# Patient Record
Sex: FEMALE | Race: WHITE | NOT HISPANIC OR LATINO | Employment: FULL TIME | URBAN - METROPOLITAN AREA
[De-identification: names, ages, dates, MRNs, and addresses within clinical notes are randomized per-mention and may not be internally consistent; named-entity substitution may affect disease eponyms.]

---

## 2021-06-29 ENCOUNTER — OFFICE VISIT (OUTPATIENT)
Dept: ENDOCRINOLOGY | Facility: CLINIC | Age: 31
End: 2021-06-29
Payer: COMMERCIAL

## 2021-06-29 VITALS
HEIGHT: 61 IN | WEIGHT: 183 LBS | BODY MASS INDEX: 34.55 KG/M2 | HEART RATE: 74 BPM | SYSTOLIC BLOOD PRESSURE: 100 MMHG | DIASTOLIC BLOOD PRESSURE: 80 MMHG

## 2021-06-29 DIAGNOSIS — R53.83 FATIGUE, UNSPECIFIED TYPE: ICD-10-CM

## 2021-06-29 DIAGNOSIS — E66.09 CLASS 1 OBESITY DUE TO EXCESS CALORIES WITHOUT SERIOUS COMORBIDITY WITH BODY MASS INDEX (BMI) OF 34.0 TO 34.9 IN ADULT: ICD-10-CM

## 2021-06-29 DIAGNOSIS — E28.2 PCOS (POLYCYSTIC OVARIAN SYNDROME): Primary | ICD-10-CM

## 2021-06-29 DIAGNOSIS — E55.9 VITAMIN D DEFICIENCY: ICD-10-CM

## 2021-06-29 PROCEDURE — 99204 OFFICE O/P NEW MOD 45 MIN: CPT | Performed by: INTERNAL MEDICINE

## 2021-06-29 NOTE — PROGRESS NOTES
New Patient Note      CC: concern for PCOS    History of Present Illness:   31 yr female with suspected PCOS,  and pseudotumor cerebri(diagnosed based on papilledema) at [facility] in 2017  She is concerned about inability to loose weight and is concerned that it is related to PCOS  She reports fatigue, brain fog, restless legs and fractured sleep  Pt ht 5'1" Max weight 205lbs, current 180lbs  Family hx of obesity - mother 7'10" 36lbs, dad 10' 26+, brother 7'9" 300+    Diet: tried VLCD, low gluten and non daily food for last 2 months  Without change  Activity: hiking upto 12 miles twice weekly and generally more active  Tried weight training without much benefit  Menarche at age 14, were irregular throughout, she was placed on OCP at age 23  She first became pregnant in context of irregular use of OCP in 2015  She miscarried at 6-8 weeks  Second pregnancy was 2017 off OCP, miscarried  Then she tried to get pregnant but could not so she was seen by reproductive medicine  They suggested diagnosis of PCOS based on multiple ovarian cysts  She was started on inositol supplements and she conceived naturally with a successful pregnancy resulting in  2019  She cannot recall reported high testosterone levels but does report facial hair, upper lip requiring shaving since teenage times  Recently, she noticed that she has regular periods over last 2 months  LMP 21 lasted 5 days  Family Hx of thyroid: No    There is no problem list on file for this patient  No past medical history on file     Past Surgical History:   Procedure Laterality Date    WISDOM TOOTH EXTRACTION        Family History   Problem Relation Age of Onset    Hypertension Father     Heart attack Father     Stroke Father     Kidney cancer Maternal Grandmother     Stomach cancer Maternal Grandfather     Heart attack Maternal Grandfather     Lung cancer Paternal Grandmother     Polycystic ovary syndrome Cousin dad      Social History     Tobacco Use    Smoking status: Former Smoker     Quit date: 2021     Years since quittin 0    Smokeless tobacco: Former User   Substance Use Topics    Alcohol use: Yes     Comment: once a month (beer)     Allergies   Allergen Reactions    Strattera [Atomoxetine] Palpitations     Review of Systems   Constitutional: Positive for fatigue  HENT: Negative  Eyes: Negative  Respiratory: Negative  Cardiovascular: Negative  Gastrointestinal: Negative  Endocrine: Negative  Musculoskeletal: Negative  Skin: Negative  Hair loss, dry skin   Allergic/Immunologic: Negative  Neurological: Negative  RLS,    Hematological: Negative  Psychiatric/Behavioral: Negative  Current Outpatient Medications:     norgestrel-ethinyl estradiol (LO/OVRAL) 0 3 mg-30 mcg per tablet, Take 1 tablet by mouth, Disp: , Rfl:     Physical Exam:  Body mass index is 34 58 kg/m²  /80 (BP Location: Left arm, Patient Position: Sitting, Cuff Size: Large)   Pulse 74   Ht 5' 1" (1 549 m)   Wt 83 kg (183 lb)   BMI 34 58 kg/m²        Physical Exam  Constitutional:       Appearance: She is well-developed  HENT:      Head: Normocephalic  Eyes:      Pupils: Pupils are equal, round, and reactive to light  Neck:      Thyroid: No thyromegaly  Cardiovascular:      Rate and Rhythm: Normal rate  Heart sounds: Normal heart sounds  Pulmonary:      Effort: Pulmonary effort is normal       Breath sounds: Normal breath sounds  Abdominal:      General: Bowel sounds are normal       Palpations: Abdomen is soft  Musculoskeletal:         General: No deformity  Cervical back: Normal range of motion  Skin:     Capillary Refill: Capillary refill takes less than 2 seconds  Coloration: Skin is not pale  Findings: No rash  Neurological:      Mental Status: She is alert and oriented to person, place, and time           Labs:     No results found for: [id number], TSH, V3TACBA, B2SCXVI, THYROIDAB    Lab Results   Component Value Date    CREATININE 0 67 11/23/2015    BUN 16 11/23/2015     11/23/2015    K 3 9 11/23/2015     11/23/2015    CO2 27 2 11/23/2015     No results found for: EGFR    No results found for: ALT, AST, GGT, ALKPHOS, BILITOT    No results found for: CHOLESTEROL  No results found for: HDL  No results found for: TRIG  No results found for: NONHDLC      Impression:  1  PCOS (polycystic ovarian syndrome)    2  Vitamin D deficiency    3  Fatigue, unspecified type    4  Class 1 obesity due to excess calories without serious comorbidity with body mass index (BMI) of 34 0 to 34 9 in adult         Plan:    Diagnoses and all orders for this visit:    PCOS (polycystic ovarian syndrome)  She reports diagnosis in 2017 but does not recall testosterone levels  She has normal menstrual cycles and prior conception on inositol  She has never been on metformin  We will initiate work up with labs as listed below  She may have some insulin resistance but no acanthosis nigricans  Will also get labs from PCP done recent past     She seems to be controlled from diet and lifestyle  We will consider metformin vs other medications  Follow up in 6 months    -     Hemoglobin A1C; Future  -     17-Hydroxypregnenolone; Future  -     Testosterone, free, total; Future      Obesity  She reports difficulty losing weight with diet and lifestyle changes over 2 months  Other possibilities commonly contributing are untreated ALIDA, hypothyroidism, insulin resistance/PCOS, chronic inflammation, mental health illness etc   Suggested structured medical fitness program along with diet management  She might benefit from medications for weight loss  May consider a GLP1 agonist     Vitamin D deficiency  -     Vitamin D 25 hydroxy; Future    Fatigue, unspecified type  -     Iron Panel (Includes Ferritin, Iron Sat%, Iron, and TIBC);  Future  -     Comprehensive metabolic panel; Future  -     CBC and differential; Future        I have spent 50 minutes with patient today in which greater than 50% of this time was spent in counseling/coordination of care  All questioned fully answered  She will call me if any problems arise  Educated/ Counseled patient on diagnostic test results, prognosis, risk vs benefit of treatment options, importance of treatment compliance, healthy life and lifestyle choices        [address]

## 2021-06-30 ENCOUNTER — TELEPHONE (OUTPATIENT)
Dept: ENDOCRINOLOGY | Facility: CLINIC | Age: 31
End: 2021-06-30

## 2021-06-30 NOTE — TELEPHONE ENCOUNTER
----- Message from Carl Lenz MD sent at 6/30/2021 10:16 AM EDT -----  Please let her know that I reviewed some labs from May 18, 2021, were entered into our system  She has anemia which is low red blood cell counts  This alone can make her fatigued and she should speak to her PCP about this   Thank you

## 2022-01-03 PROBLEM — E28.2 PCOS (POLYCYSTIC OVARIAN SYNDROME): Status: ACTIVE | Noted: 2022-01-03

## 2022-07-07 ENCOUNTER — OFFICE VISIT (OUTPATIENT)
Dept: URGENT CARE | Facility: CLINIC | Age: 32
End: 2022-07-07
Payer: COMMERCIAL

## 2022-07-07 DIAGNOSIS — J02.9 ACUTE PHARYNGITIS, UNSPECIFIED ETIOLOGY: ICD-10-CM

## 2022-07-07 DIAGNOSIS — J02.9 SORE THROAT: ICD-10-CM

## 2022-07-07 DIAGNOSIS — J06.9 UPPER RESPIRATORY TRACT INFECTION, UNSPECIFIED TYPE: Primary | ICD-10-CM

## 2022-07-07 LAB — S PYO AG THROAT QL: NEGATIVE

## 2022-07-07 PROCEDURE — 87880 STREP A ASSAY W/OPTIC: CPT | Performed by: PHYSICIAN ASSISTANT

## 2022-07-07 PROCEDURE — G0382 LEV 3 HOSP TYPE B ED VISIT: HCPCS | Performed by: PHYSICIAN ASSISTANT

## 2022-07-07 RX ORDER — AZITHROMYCIN 250 MG/1
TABLET, FILM COATED ORAL
Qty: 6 TABLET | Refills: 0 | Status: SHIPPED | OUTPATIENT
Start: 2022-07-07 | End: 2022-07-11

## 2022-07-07 NOTE — PATIENT INSTRUCTIONS
Pharyngitis   AMBULATORY CARE:   Pharyngitis , or sore throat, is inflammation of the tissues and structures in your pharynx (throat)  Pharyngitis is most often caused by bacteria  It may also be caused by a cold or flu virus  Other causes include smoking, allergies, or acid reflux  Signs and symptoms that may occur with pharyngitis:   Sore throat or pain when you swallow    Fever, chills, and body aches    Hoarse or raspy voice    Cough, runny or stuffy nose, itchy or watery eyes    Headache    Upset stomach and loss of appetite    Mild neck stiffness    Swollen glands that feel like hard lumps when you touch your neck    White and yellow pus-filled blisters in the back of your throat    Call 911 for any of the following: You have trouble breathing or swallowing because your throat is swollen or sore  Seek care immediately if:   You are drooling because it hurts too much to swallow  Your fever is higher than 102? F (39?C) or lasts longer than 3 days  You are confused  You taste blood in your throat  Contact your healthcare provider if:   Your throat pain gets worse  You have a painful lump in your throat that does not go away after 5 days  Your symptoms do not improve after 5 days  You have questions or concerns about your condition or care  Treatment for pharyngitis:  Viral pharyngitis will go away on its own without treatment  Your sore throat should start to feel better in 3 to 5 days for both viral and bacterial infections  You may need any of the following:  Antibiotics  treat a bacterial infection  NSAIDs , such as ibuprofen, help decrease swelling, pain, and fever  NSAIDs can cause stomach bleeding or kidney problems in certain people  If you take blood thinner medicine, always ask your healthcare provider if NSAIDs are safe for you  Always read the medicine label and follow directions  Acetaminophen  decreases pain and fever  It is available without a doctor's order  Ask how much to take and how often to take it  Follow directions  Acetaminophen can cause liver damage if not taken correctly  Manage your symptoms:   Gargle salt water  Mix ¼ teaspoon salt in an 8 ounce glass of warm water and gargle  This may help decrease swelling in your throat  Drink liquids as directed  You may need to drink more liquids than usual  Liquids may help soothe your throat and prevent dehydration  Ask how much liquid to drink each day and which liquids are best for you  Use a cool-steam humidifier  to help moisten the air in your room and calm your cough  Soothe your throat  with cough drops, ice, soft foods, or popsicles  Prevent the spread of pharyngitis:  Cover your mouth and nose when you cough or sneeze  Do not share food or drinks  Wash your hands often  Use soap and water  If soap and water are unavailable, use an alcohol based hand   Follow up with your doctor as directed:  Write down your questions so you remember to ask them during your visits  © Copyright [employer] 2022 Information is for End User's use only and may not be sold, redistributed or otherwise used for commercial purposes  All illustrations and images included in CareNotes® are the copyrighted property of [employer]  or [facility] [employer]   The above information is an  only  It is not intended as medical advice for individual conditions or treatments  Talk to your doctor, nurse or pharmacist before following any medical regimen to see if it is safe and effective for you

## 2022-07-11 NOTE — PROGRESS NOTES
[facility]        NAME: Garfield Medina is a 34 y o  female  : 1990    MRN: [id number]  DATE: 2022  TIME: 6:52 AM    Assessment and Plan   Upper respiratory tract infection, unspecified type [J06 9]  1  Upper respiratory tract infection, unspecified type     2  Acute pharyngitis, unspecified etiology  azithromycin (ZITHROMAX) 250 mg tablet   3  Sore throat  POCT rapid strepA    CANCELED: Throat culture         Patient Instructions       Follow up with PCP in 3-5 days  Proceed to  ER if symptoms worsen  Chief Complaint     Chief Complaint   Patient presents with    Fever     Yesterday tmax 102  3  negative home test for COVID yesterday    Shortness of Breath    Sore Throat     Began tuesday         History of Present Illness       69-year-old female presents the clinic with sore throat, nasal congestion, rhinorrhea, shortness of breath, fevers that started yesterday  Patient notes T-max 102 3° at home  Patient is being seen with her son and mother who are also being seen for similar symptoms  Fever  Associated symptoms include congestion, coughing, a fever and a sore throat  Pertinent negatives include no abdominal pain, chills, headaches, myalgias, nausea or vomiting  Shortness of Breath  Associated symptoms include coughing, rhinorrhea and a sore throat  Pertinent negatives include no dizziness  Sore Throat   Associated symptoms include congestion, coughing and shortness of breath  Pertinent negatives include no abdominal pain, diarrhea, headaches or vomiting  Review of Systems   Review of Systems   Constitutional: Positive for fever  Negative for chills  HENT: Positive for congestion, rhinorrhea and sore throat  Respiratory: Positive for cough and shortness of breath  Gastrointestinal: Negative for abdominal pain, diarrhea, nausea and vomiting  Musculoskeletal: Negative for myalgias  Neurological: Negative for dizziness, light-headedness and headaches  Current Medications       Current Outpatient Medications:     azithromycin (ZITHROMAX) 250 mg tablet, Take 2 tablets today then 1 tablet daily x 4 days, Disp: 6 tablet, Rfl: 0    norgestrel-ethinyl estradiol (LO/OVRAL) 0 3 mg-30 mcg per tablet, Take 1 tablet by mouth, Disp: , Rfl:     Current Allergies     Allergies as of 07/07/2022 - Reviewed 07/07/2022   Allergen Reaction Noted    Strattera [atomoxetine] Palpitations 06/29/2021            The following portions of the patient's history were reviewed and updated as appropriate: allergies, current medications, past family history, past medical history, past social history, past surgical history and problem list      History reviewed  No pertinent past medical history  Past Surgical History:   Procedure Laterality Date    WISDOM TOOTH EXTRACTION         Family History   Problem Relation Age of Onset    Hypertension Father     Heart attack Father     Stroke Father     Kidney cancer Maternal Grandmother     Stomach cancer Maternal Grandfather     Heart attack Maternal Grandfather     Lung cancer Paternal Grandmother     Polycystic ovary syndrome Cousin         dad          Medications have been verified  Objective   There were no vitals taken for this visit  No LMP recorded  Physical Exam     Physical Exam  Vitals and nursing note reviewed  Constitutional:       General: She is not in acute distress  Appearance: She is well-developed  She is not diaphoretic  HENT:      Head: Normocephalic and atraumatic  Right Ear: Tympanic membrane normal       Left Ear: Tympanic membrane normal       Nose: Mucosal edema and rhinorrhea present  Mouth/Throat:      Pharynx: Uvula midline  Posterior oropharyngeal erythema (PND) present  Comments: Rapid strep negative  Cardiovascular:      Rate and Rhythm: Normal rate and regular rhythm     Pulmonary:      Effort: Pulmonary effort is normal       Breath sounds: Normal breath sounds  Musculoskeletal:         General: Normal range of motion  Skin:     General: Skin is warm and dry  Findings: No rash  Neurological:      Mental Status: She is alert and oriented to person, place, and time

## 2022-12-02 ENCOUNTER — OFFICE VISIT (OUTPATIENT)
Dept: URGENT CARE | Facility: CLINIC | Age: 32
End: 2022-12-02

## 2022-12-02 VITALS
OXYGEN SATURATION: 100 % | RESPIRATION RATE: 18 BRPM | TEMPERATURE: 98 F | SYSTOLIC BLOOD PRESSURE: 118 MMHG | DIASTOLIC BLOOD PRESSURE: 70 MMHG | HEART RATE: 88 BPM

## 2022-12-02 DIAGNOSIS — R05.1 ACUTE COUGH: ICD-10-CM

## 2022-12-02 DIAGNOSIS — H65.92 LEFT NON-SUPPURATIVE OTITIS MEDIA: Primary | ICD-10-CM

## 2022-12-02 RX ORDER — METHYLPREDNISOLONE 4 MG/1
TABLET ORAL
Qty: 21 TABLET | Refills: 0 | Status: SHIPPED | OUTPATIENT
Start: 2022-12-02

## 2022-12-02 RX ORDER — BUDESONIDE AND FORMOTEROL FUMARATE DIHYDRATE 160; 4.5 UG/1; UG/1
2 AEROSOL RESPIRATORY (INHALATION) 2 TIMES DAILY
COMMUNITY

## 2022-12-02 RX ORDER — AZITHROMYCIN 250 MG/1
TABLET, FILM COATED ORAL
Qty: 6 TABLET | Refills: 0 | Status: SHIPPED | OUTPATIENT
Start: 2022-12-02 | End: 2022-12-06

## 2022-12-02 RX ORDER — ALBUTEROL SULFATE 90 UG/1
2 AEROSOL, METERED RESPIRATORY (INHALATION) EVERY 6 HOURS PRN
COMMUNITY

## 2022-12-02 NOTE — PATIENT INSTRUCTIONS
Ear Infection   AMBULATORY CARE:   An ear infection  is also called otitis media  Blocked or swollen eustachian tubes can cause an infection  Eustachian tubes connect the middle ear to the back of the nose and throat  They drain fluid from the middle ear  You may have a buildup of fluid in your ear  Germs build up in the fluid and infection develops  Common signs and symptoms:   Ear pain    Fever or a headache    Trouble hearing    Ringing or buzzing in your ear    Plugged ear or an ear that feels full    Dizziness    Nausea or vomiting    Seek care immediately if:   You have clear fluid coming from your ear  You have a stiff neck, headache, and a fever  Call your doctor if:   You see blood or pus draining from your ear  Your ear pain gets worse or does not go away, even after treatment  The outside of your ear is red or swollen  You are vomiting or have diarrhea  You have questions or concerns about your condition or care  Medicines: You may  need any of the following:  Acetaminophen  decreases pain and fever  It is available without a doctor's order  Ask how much to take and how often to take it  Follow directions  Read the labels of all other medicines you are using to see if they also contain acetaminophen, or ask your doctor or pharmacist  Acetaminophen can cause liver damage if not taken correctly  Do not use more than 4 grams (4,000 milligrams) total of acetaminophen in one day  NSAIDs , such as ibuprofen, help decrease swelling, pain, and fever  This medicine is available with or without a doctor's order  NSAIDs can cause stomach bleeding or kidney problems in certain people  If you take blood thinner medicine, always ask your healthcare provider if NSAIDs are safe for you  Always read the medicine label and follow directions  Ear drops  may contain medicine to decrease pain and inflammation  Antibiotics  help treat a bacterial infection      Self-care:   Apply heat  on your ear for 15 to 20 minutes, 3 to 4 times a day or as directed  You can apply heat with an electric heating pad, hot water bottle, or warm compress  Always put a cloth between your skin and the heat pack to prevent burns  Heat helps decrease pain  Apply ice  on your ear for 15 to 20 minutes, 3 to 4 times a day for 2 days or as directed  Use an ice pack, or put crushed ice in a plastic bag  Cover it with a towel before you apply it to your ear  Ice decreases swelling and pain  Prevent an ear infection:   Wash your hands often  to help prevent the spread of germs  Ask everyone in your house to wash their hands with soap and water  Ask them to wash after they use the bathroom or change a diaper  Remind them to wash before they prepare or eat food  Stay away from people who are ill  Some germs spread easily and quickly through contact  Follow up with your doctor as directed:  Write down your questions so you remember to ask them during your visits  © Copyright [employer] 2022 Information is for End User's use only and may not be sold, redistributed or otherwise used for commercial purposes  All illustrations and images included in CareNotes® are the copyrighted property of [employer]  or [facility] [employer]   The above information is an  only  It is not intended as medical advice for individual conditions or treatments  Talk to your doctor, nurse or pharmacist before following any medical regimen to see if it is safe and effective for you

## 2023-01-02 NOTE — PROGRESS NOTES
[facility]        NAME: Ginny Kelley is a 34 y o  female  : 1990    MRN: [id number]  DATE: 2023  TIME: 6:40 AM    Assessment and Plan   Left non-suppurative otitis media [H65 92]  1  Left non-suppurative otitis media  azithromycin (ZITHROMAX) 250 mg tablet      2  Acute cough  methylPREDNISolone 4 MG tablet therapy pack            Patient Instructions       Follow up with PCP in 3-5 days  Proceed to  ER if symptoms worsen  Chief Complaint     Chief Complaint   Patient presents with   • Cough     (Pt had 3-yr-old son diagnosed with the flu) on  She began to have symptoms  Cough, sore throat, productive cough and fever  History of Present Illness       33 yo F presents with Cough, sore throat, productive cough and fever  Pt had 3-yr-old son diagnosed with the flu on  and on  She began to have symptoms  Review of Systems   Review of Systems   Constitutional: Positive for fever  Negative for chills  HENT: Positive for congestion and rhinorrhea  Respiratory: Positive for cough  Gastrointestinal: Negative for abdominal pain, diarrhea, nausea and vomiting  Musculoskeletal: Negative for myalgias  Neurological: Negative for dizziness, light-headedness and headaches           Current Medications       Current Outpatient Medications:   •  albuterol (PROVENTIL HFA,VENTOLIN HFA) 90 mcg/act inhaler, Inhale 2 puffs every 6 (six) hours as needed for wheezing, Disp: , Rfl:   •  budesonide-formoterol (SYMBICORT) 160-4 5 mcg/act inhaler, Inhale 2 puffs 2 (two) times a day Rinse mouth after use , Disp: , Rfl:   •  methylPREDNISolone 4 MG tablet therapy pack, Use as directed on package, Disp: 21 tablet, Rfl: 0  •  norgestrel-ethinyl estradiol (LO/OVRAL) 0 3 mg-30 mcg per tablet, Take 1 tablet by mouth, Disp: , Rfl:     Current Allergies     Allergies as of 2022 - Reviewed 2022   Allergen Reaction Noted   • Strattera [atomoxetine] Palpitations 06/29/2021            The following portions of the patient's history were reviewed and updated as appropriate: allergies, current medications, past family history, past medical history, past social history, past surgical history and problem list      History reviewed  No pertinent past medical history  Past Surgical History:   Procedure Laterality Date   • WISDOM TOOTH EXTRACTION         Family History   Problem Relation Age of Onset   • Hypertension Father    • Heart attack Father    • Stroke Father    • Kidney cancer Maternal Grandmother    • Stomach cancer Maternal Grandfather    • Heart attack Maternal Grandfather    • Lung cancer Paternal Grandmother    • Polycystic ovary syndrome Cousin         dad          Medications have been verified  Objective   /70   Pulse 88   Temp 98 °F (36 7 °C)   Resp 18   SpO2 100%   No LMP recorded  Physical Exam     Physical Exam  Vitals and nursing note reviewed  Constitutional:       General: She is not in acute distress  Appearance: She is well-developed  She is not diaphoretic  HENT:      Head: Normocephalic and atraumatic  Right Ear: Tympanic membrane normal       Left Ear: Tympanic membrane is erythematous  Nose: Mucosal edema and rhinorrhea present  Mouth/Throat:      Pharynx: Uvula midline  Posterior oropharyngeal erythema (PND) present  Cardiovascular:      Rate and Rhythm: Normal rate and regular rhythm  Pulmonary:      Effort: Pulmonary effort is normal       Breath sounds: Normal breath sounds  Musculoskeletal:         General: Normal range of motion  Skin:     General: Skin is warm and dry  Findings: No rash  Neurological:      Mental Status: She is alert and oriented to person, place, and time

## 2023-08-05 ENCOUNTER — OFFICE VISIT (OUTPATIENT)
Dept: URGENT CARE | Facility: CLINIC | Age: 33
End: 2023-08-05
Payer: COMMERCIAL

## 2023-08-05 VITALS
OXYGEN SATURATION: 99 % | HEART RATE: 75 BPM | TEMPERATURE: 98.5 F | RESPIRATION RATE: 18 BRPM | DIASTOLIC BLOOD PRESSURE: 78 MMHG | SYSTOLIC BLOOD PRESSURE: 112 MMHG

## 2023-08-05 DIAGNOSIS — J02.9 ACUTE PHARYNGITIS, UNSPECIFIED ETIOLOGY: Primary | ICD-10-CM

## 2023-08-05 PROCEDURE — G0382 LEV 3 HOSP TYPE B ED VISIT: HCPCS

## 2023-08-05 RX ORDER — DROSPIRENONE 4 MG/1
TABLET, FILM COATED ORAL
COMMUNITY
Start: 2023-07-25

## 2023-08-05 NOTE — PROGRESS NOTES
[facility] Now        NAME: Khadra Rodriguez is a 28 y.o. female  : 1990    MRN: [id number]  DATE: 2023  TIME: 6:31 PM    Assessment and Plan   Acute pharyngitis, unspecified etiology [J02.9]  1. Acute pharyngitis, unspecified etiology          - Declined rapid strep at this time    Patient Instructions       Follow up with PCP in 3-5 days. Proceed to  ER if symptoms worsen. Chief Complaint     Chief Complaint   Patient presents with   • Sore Throat     Pt reports sore throat x2 hours. History of Present Illness       27 y/o F presents for sore throat x 2 hrs. Son is ill, without sore throat. No OTC meds. No other symptoms. Review of Systems   Review of Systems   Constitutional: Negative for chills and fever. HENT: Positive for sore throat. Negative for congestion, ear pain and rhinorrhea. Respiratory: Negative for cough.           Current Medications       Current Outpatient Medications:   •  albuterol (PROVENTIL HFA,VENTOLIN HFA) 90 mcg/act inhaler, Inhale 2 puffs every 6 (six) hours as needed for wheezing, Disp: , Rfl:   •  budesonide-formoterol (SYMBICORT) 160-4.5 mcg/act inhaler, Inhale 2 puffs 2 (two) times a day Rinse mouth after use., Disp: , Rfl:   •  Slynd 4 MG TABS, , Disp: , Rfl:   •  methylPREDNISolone 4 MG tablet therapy pack, Use as directed on package (Patient not taking: Reported on 2023), Disp: 21 tablet, Rfl: 0  •  norgestrel-ethinyl estradiol (LO/OVRAL) 0.3 mg-30 mcg per tablet, Take 1 tablet by mouth (Patient not taking: Reported on 2023), Disp: , Rfl:     Current Allergies     Allergies as of 2023 - Reviewed 2022   Allergen Reaction Noted   • Strattera [atomoxetine] Palpitations 2021            The following portions of the patient's history were reviewed and updated as appropriate: allergies, current medications, past family history, past medical history, past social history, past surgical history and problem list.     No past medical history on file. Past Surgical History:   Procedure Laterality Date   • WISDOM TOOTH EXTRACTION         Family History   Problem Relation Age of Onset   • Hypertension Father    • Heart attack Father    • Stroke Father    • Kidney cancer Maternal Grandmother    • Stomach cancer Maternal Grandfather    • Heart attack Maternal Grandfather    • Lung cancer Paternal Grandmother    • Polycystic ovary syndrome Cousin         dad          Medications have been verified. Objective   /78   Pulse 75   Temp 98.5 °F (36.9 °C) (Temporal)   Resp 18   LMP 07/22/2023 (Exact Date) Comment: Pt reports LMP was normal.  SpO2 99%   Patient's last menstrual period was 07/22/2023 (exact date). Physical Exam     Physical Exam  Vitals and nursing note reviewed. Constitutional:       General: She is not in acute distress. Appearance: She is not toxic-appearing. HENT:      Head: Normocephalic and atraumatic. Right Ear: Tympanic membrane and ear canal normal.      Left Ear: Tympanic membrane and ear canal normal.      Nose: No congestion or rhinorrhea. Mouth/Throat:      Mouth: Mucous membranes are moist.      Pharynx: No pharyngeal swelling, oropharyngeal exudate or posterior oropharyngeal erythema. Tonsils: No tonsillar exudate. Lymphadenopathy:      Cervical: No cervical adenopathy. Neurological:      Mental Status: She is alert.    Psychiatric:         Mood and Affect: Mood normal.         Behavior: Behavior normal.

## 2023-11-14 ENCOUNTER — OFFICE VISIT (OUTPATIENT)
Dept: URGENT CARE | Facility: CLINIC | Age: 33
End: 2023-11-14
Payer: COMMERCIAL

## 2023-11-14 VITALS
WEIGHT: 190 LBS | OXYGEN SATURATION: 99 % | HEART RATE: 83 BPM | SYSTOLIC BLOOD PRESSURE: 120 MMHG | DIASTOLIC BLOOD PRESSURE: 80 MMHG | HEIGHT: 61 IN | BODY MASS INDEX: 35.87 KG/M2 | TEMPERATURE: 99.6 F | RESPIRATION RATE: 16 BRPM

## 2023-11-14 DIAGNOSIS — J02.9 PHARYNGITIS, UNSPECIFIED ETIOLOGY: ICD-10-CM

## 2023-11-14 DIAGNOSIS — Z20.822 ENCOUNTER FOR LABORATORY TESTING FOR COVID-19 VIRUS: ICD-10-CM

## 2023-11-14 DIAGNOSIS — J02.9 SORE THROAT: Primary | ICD-10-CM

## 2023-11-14 LAB
S PYO AG THROAT QL: NEGATIVE
SARS-COV-2 AG UPPER RESP QL IA: NEGATIVE
VALID CONTROL: NORMAL

## 2023-11-14 PROCEDURE — 99213 OFFICE O/P EST LOW 20 MIN: CPT | Performed by: PHYSICIAN ASSISTANT

## 2023-11-14 PROCEDURE — 87880 STREP A ASSAY W/OPTIC: CPT | Performed by: PHYSICIAN ASSISTANT

## 2023-11-14 PROCEDURE — 87811 SARS-COV-2 COVID19 W/OPTIC: CPT | Performed by: PHYSICIAN ASSISTANT

## 2023-11-14 RX ORDER — AMOXICILLIN 500 MG/1
500 CAPSULE ORAL EVERY 8 HOURS SCHEDULED
Qty: 30 CAPSULE | Refills: 0 | Status: SHIPPED | OUTPATIENT
Start: 2023-11-14 | End: 2023-11-24

## 2023-11-14 NOTE — PROGRESS NOTES
[facility]        NAME: Yaneli Cornelius is a 28 y.o. female  : 1990    MRN: [id number]  DATE: 2023  TIME: 8:51 AM    /80 (BP Location: Left arm, Patient Position: Sitting)   Pulse 83   Temp 99.6 °F (37.6 °C)   Resp 16   Ht 5' 1" (1.549 m)   Wt 86.2 kg (190 lb)   SpO2 99%   BMI 35.90 kg/m²     Assessment and Plan   Pharyngitis, unspecified etiology [J02.9]  1. Pharyngitis, unspecified etiology  POCT rapid strepA    Poct Covid 19 Rapid Antigen Test    amoxicillin (AMOXIL) 500 mg capsule      2. Encounter for laboratory testing for COVID-19 virus              Patient Instructions       Follow up with PCP in 3-5 days. Proceed to  ER if symptoms worsen. Chief Complaint     Chief Complaint   Patient presents with    Sore Throat     Pt reports sore throat that has been ongoing for one week intermittently. States progression of symptoms this morning. C/o cold like symptoms for two weeks. Positive Step exposure in household two weeks ago. History of Present Illness       Pt with 1 week of sore throat getting worse this morning,+ strep in family         Review of Systems   Review of Systems   Constitutional: Negative. HENT:  Positive for congestion and sore throat. Eyes: Negative. Respiratory:  Positive for cough. Cardiovascular: Negative. Gastrointestinal: Negative. Endocrine: Negative. Genitourinary: Negative. Musculoskeletal: Negative. Skin: Negative. Allergic/Immunologic: Negative. Neurological: Negative. Hematological: Negative. Psychiatric/Behavioral: Negative. All other systems reviewed and are negative.         Current Medications       Current Outpatient Medications:     amoxicillin (AMOXIL) 500 mg capsule, Take 1 capsule (500 mg total) by mouth every 8 (eight) hours for 10 days, Disp: 30 capsule, Rfl: 0    albuterol (PROVENTIL HFA,VENTOLIN HFA) 90 mcg/act inhaler, Inhale 2 puffs every 6 (six) hours as needed for wheezing (Patient not taking: Reported on 11/14/2023), Disp: , Rfl:     budesonide-formoterol (SYMBICORT) 160-4.5 mcg/act inhaler, Inhale 2 puffs 2 (two) times a day Rinse mouth after use. (Patient not taking: Reported on 11/14/2023), Disp: , Rfl:     methylPREDNISolone 4 MG tablet therapy pack, Use as directed on package (Patient not taking: Reported on 8/5/2023), Disp: 21 tablet, Rfl: 0    norgestrel-ethinyl estradiol (LO/OVRAL) 0.3 mg-30 mcg per tablet, Take 1 tablet by mouth (Patient not taking: Reported on 8/5/2023), Disp: , Rfl:     Slynd 4 MG TABS, , Disp: , Rfl:     Current Allergies     Allergies as of 11/14/2023 - Reviewed 11/14/2023   Allergen Reaction Noted    Strattera [atomoxetine] Palpitations 06/29/2021            The following portions of the patient's history were reviewed and updated as appropriate: allergies, current medications, past family history, past medical history, past social history, past surgical history and problem list.     History reviewed. No pertinent past medical history. Past Surgical History:   Procedure Laterality Date    WISDOM TOOTH EXTRACTION         Family History   Problem Relation Age of Onset    Hypertension Father     Heart attack Father     Stroke Father     Kidney cancer Maternal Grandmother     Stomach cancer Maternal Grandfather     Heart attack Maternal Grandfather     Lung cancer Paternal Grandmother     Polycystic ovary syndrome Cousin         dad          Medications have been verified. Objective   /80 (BP Location: Left arm, Patient Position: Sitting)   Pulse 83   Temp 99.6 °F (37.6 °C)   Resp 16   Ht 5' 1" (1.549 m)   Wt 86.2 kg (190 lb)   SpO2 99%   BMI 35.90 kg/m²        Physical Exam     Physical Exam  Vitals and nursing note reviewed. Constitutional:       Appearance: She is well-developed and normal weight. HENT:      Head: Normocephalic and atraumatic.       Right Ear: Tympanic membrane and ear canal normal.      Left Ear: Tympanic membrane and ear canal normal.      Nose: Rhinorrhea present. Mouth/Throat:      Mouth: Mucous membranes are moist.      Pharynx: Posterior oropharyngeal erythema present. Tonsils: No tonsillar exudate or tonsillar abscesses. Eyes:      Conjunctiva/sclera: Conjunctivae normal.      Pupils: Pupils are equal, round, and reactive to light. Cardiovascular:      Rate and Rhythm: Normal rate and regular rhythm. Heart sounds: Normal heart sounds. Pulmonary:      Effort: Pulmonary effort is normal.      Breath sounds: Normal breath sounds. Abdominal:      General: Bowel sounds are normal.      Palpations: Abdomen is soft. Musculoskeletal:      Cervical back: Normal range of motion and neck supple. Lymphadenopathy:      Cervical: Cervical adenopathy present. Skin:     General: Skin is warm. Capillary Refill: Capillary refill takes less than 2 seconds. Neurological:      General: No focal deficit present. Mental Status: She is alert.

## 2023-11-16 LAB — B-HEM STREP SPEC QL CULT: POSITIVE

## 2023-12-14 ENCOUNTER — OFFICE VISIT (OUTPATIENT)
Dept: URGENT CARE | Facility: CLINIC | Age: 33
End: 2023-12-14
Payer: COMMERCIAL

## 2023-12-14 VITALS
RESPIRATION RATE: 18 BRPM | DIASTOLIC BLOOD PRESSURE: 84 MMHG | SYSTOLIC BLOOD PRESSURE: 114 MMHG | TEMPERATURE: 97.6 F | OXYGEN SATURATION: 100 % | HEART RATE: 89 BPM

## 2023-12-14 DIAGNOSIS — N39.0 URINARY TRACT INFECTION WITH HEMATURIA, SITE UNSPECIFIED: Primary | ICD-10-CM

## 2023-12-14 DIAGNOSIS — R31.9 URINARY TRACT INFECTION WITH HEMATURIA, SITE UNSPECIFIED: Primary | ICD-10-CM

## 2023-12-14 LAB
SL AMB  POCT GLUCOSE, UA: ABNORMAL
SL AMB LEUKOCYTE ESTERASE,UA: ABNORMAL
SL AMB POCT BILIRUBIN,UA: ABNORMAL
SL AMB POCT BLOOD,UA: ABNORMAL
SL AMB POCT CLARITY,UA: CLEAR
SL AMB POCT COLOR,UA: ABNORMAL
SL AMB POCT KETONES,UA: ABNORMAL
SL AMB POCT NITRITE,UA: ABNORMAL
SL AMB POCT PH,UA: 6.5
SL AMB POCT SPECIFIC GRAVITY,UA: 1.01
SL AMB POCT URINE PROTEIN: ABNORMAL
SL AMB POCT UROBILINOGEN: 0.2

## 2023-12-14 PROCEDURE — 99213 OFFICE O/P EST LOW 20 MIN: CPT | Performed by: PHYSICIAN ASSISTANT

## 2023-12-14 PROCEDURE — 81002 URINALYSIS NONAUTO W/O SCOPE: CPT | Performed by: PHYSICIAN ASSISTANT

## 2023-12-14 RX ORDER — CEPHALEXIN 500 MG/1
500 CAPSULE ORAL EVERY 8 HOURS SCHEDULED
Qty: 30 CAPSULE | Refills: 0 | Status: SHIPPED | OUTPATIENT
Start: 2023-12-14 | End: 2023-12-24

## 2023-12-14 NOTE — PROGRESS NOTES
[facility]        NAME: Joana Osorio is a 28 y.o. female  : 1990    MRN: [id number]  DATE: 2023  TIME: 10:59 AM    /84   Pulse 89   Temp 97.6 °F (36.4 °C)   Resp 18   SpO2 100%     Assessment and Plan   Dysuria [R30.0]  1. Dysuria  POCT urine dip    Urine culture    Urine culture            Patient Instructions       Follow up with PCP in 3-5 days. Proceed to  ER if symptoms worsen. Chief Complaint     Chief Complaint   Patient presents with    Possible UTI     Patient c/o dysuria with fever and chills. History of Present Illness       Pt with urine discomfort for several days         Review of Systems   Review of Systems   Constitutional: Negative. HENT: Negative. Eyes: Negative. Respiratory: Negative. Cardiovascular: Negative. Gastrointestinal: Negative. Endocrine: Negative. Genitourinary:  Positive for dysuria. Musculoskeletal: Negative. Skin: Negative. Allergic/Immunologic: Negative. Neurological: Negative. Hematological: Negative. Psychiatric/Behavioral: Negative. All other systems reviewed and are negative. Current Medications       Current Outpatient Medications:     albuterol (PROVENTIL HFA,VENTOLIN HFA) 90 mcg/act inhaler, Inhale 2 puffs every 6 (six) hours as needed for wheezing (Patient not taking: Reported on 2023), Disp: , Rfl:     budesonide-formoterol (SYMBICORT) 160-4.5 mcg/act inhaler, Inhale 2 puffs 2 (two) times a day Rinse mouth after use.  (Patient not taking: Reported on 2023), Disp: , Rfl:     methylPREDNISolone 4 MG tablet therapy pack, Use as directed on package (Patient not taking: Reported on 2023), Disp: 21 tablet, Rfl: 0    norgestrel-ethinyl estradiol (LO/OVRAL) 0.3 mg-30 mcg per tablet, Take 1 tablet by mouth (Patient not taking: Reported on 2023), Disp: , Rfl:     Slynd 4 MG TABS, , Disp: , Rfl:     Current Allergies     Allergies as of 2023 - Reviewed 12/14/2023   Allergen Reaction Noted    Strattera [atomoxetine] Palpitations 06/29/2021            The following portions of the patient's history were reviewed and updated as appropriate: allergies, current medications, past family history, past medical history, past social history, past surgical history and problem list.     No past medical history on file. Past Surgical History:   Procedure Laterality Date    WISDOM TOOTH EXTRACTION         Family History   Problem Relation Age of Onset    Hypertension Father     Heart attack Father     Stroke Father     Kidney cancer Maternal Grandmother     Stomach cancer Maternal Grandfather     Heart attack Maternal Grandfather     Lung cancer Paternal Grandmother     Polycystic ovary syndrome Cousin         dad          Medications have been verified. Objective   /84   Pulse 89   Temp 97.6 °F (36.4 °C)   Resp 18   SpO2 100%        Physical Exam     Physical Exam  Vitals and nursing note reviewed. Constitutional:       Appearance: Normal appearance. She is normal weight. HENT:      Head: Normocephalic and atraumatic. Cardiovascular:      Rate and Rhythm: Normal rate and regular rhythm. Pulses: Normal pulses. Heart sounds: Normal heart sounds. Pulmonary:      Effort: Pulmonary effort is normal.      Breath sounds: Normal breath sounds. Abdominal:      General: Abdomen is flat. Bowel sounds are normal.      Palpations: Abdomen is soft. Musculoskeletal:         General: Normal range of motion. Cervical back: Normal range of motion and neck supple. Skin:     General: Skin is warm. Neurological:      Mental Status: She is alert.

## 2023-12-16 LAB
BACTERIA UR CULT: NORMAL
Lab: NORMAL

## 2024-01-02 ENCOUNTER — OFFICE VISIT (OUTPATIENT)
Dept: URGENT CARE | Facility: CLINIC | Age: 34
End: 2024-01-02
Payer: COMMERCIAL

## 2024-01-02 VITALS
TEMPERATURE: 99.4 F | OXYGEN SATURATION: 100 % | RESPIRATION RATE: 16 BRPM | HEIGHT: 61 IN | BODY MASS INDEX: 35.9 KG/M2 | HEART RATE: 114 BPM

## 2024-01-02 DIAGNOSIS — R30.0 DYSURIA: Primary | ICD-10-CM

## 2024-01-02 LAB
SL AMB  POCT GLUCOSE, UA: NEGATIVE
SL AMB LEUKOCYTE ESTERASE,UA: NEGATIVE
SL AMB POCT BILIRUBIN,UA: NEGATIVE
SL AMB POCT BLOOD,UA: ABNORMAL
SL AMB POCT CLARITY,UA: ABNORMAL
SL AMB POCT COLOR,UA: YELLOW
SL AMB POCT KETONES,UA: NEGATIVE
SL AMB POCT NITRITE,UA: NEGATIVE
SL AMB POCT PH,UA: 6.5
SL AMB POCT SPECIFIC GRAVITY,UA: 1.01
SL AMB POCT URINE PROTEIN: ABNORMAL
SL AMB POCT UROBILINOGEN: 0.2

## 2024-01-02 PROCEDURE — 99213 OFFICE O/P EST LOW 20 MIN: CPT | Performed by: NURSE PRACTITIONER

## 2024-01-02 PROCEDURE — 81002 URINALYSIS NONAUTO W/O SCOPE: CPT | Performed by: NURSE PRACTITIONER

## 2024-01-02 NOTE — PROGRESS NOTES
[facility]        NAME: Karli Peck is a 33 y.o. female  : 1990    MRN: [id number]  DATE: 2024  TIME: 6:24 PM    Assessment and Plan   Dysuria [R30.0]  1. Dysuria  POCT urine dip    Urine culture        Acute symptomatic POCT urine dip showed positive for large blood.  Will send urine culture.  Educated patient since the last urine culture was negative for UTI would recommend not starting another antibiotic until results return for culture.  Will recommend follow-up with gynecology on Thursday.  Any worsening of symptoms should report to ED    Patient Instructions       Follow up with PCP in 3-5 days.  Proceed to  ER if symptoms worsen.    Chief Complaint     Chief Complaint   Patient presents with   • Possible UTI     Pt reports dysuria, frequency, and itching with onset of symptoms today.  was seen in  on  for uti symptoms and prescribed Cefalexin.  took morning and afternoon dose but, unable to take nighttime dose. Finished treatment over the weekend without resolution of symptoms.          History of Present Illness       Patient is a 33-year-old female arrives with complaints of dysuria and frequent urination.  She was seen here 2023 provided Keflex she did take twice daily x 7 days however she reports symptoms never went away.  At last office visit her POCT urine dip showed positive for leukocytes and large amounts of blood.  The urine culture returned negative had less than 10,000 of 1 bacteria.  At this office visit her POCT urine dip showed positive for large amounts of blood again will send urine culture.  Patient does have a gynecology appointment on Thursday of this week.        Review of Systems   Review of Systems   Constitutional:  Negative for activity change, appetite change, chills, fatigue and fever.   HENT:  Negative for congestion, postnasal drip, rhinorrhea, sneezing and sore throat.    Respiratory:  Negative for cough, chest tightness,  shortness of breath and wheezing.    Cardiovascular:  Negative for chest pain and palpitations.   Gastrointestinal:  Negative for abdominal pain, constipation, diarrhea, nausea and vomiting.   Genitourinary:  Positive for dysuria, frequency and hematuria. Negative for flank pain and urgency.   Musculoskeletal:  Negative for arthralgias and myalgias.   Skin:  Negative for color change, pallor and rash.   Neurological:  Negative for dizziness, weakness, light-headedness and headaches.   Hematological:  Negative for adenopathy.   Psychiatric/Behavioral:  Negative for agitation and confusion.          Current Medications       Current Outpatient Medications:   •  albuterol (PROVENTIL HFA,VENTOLIN HFA) 90 mcg/act inhaler, Inhale 2 puffs every 6 (six) hours as needed for wheezing (Patient not taking: Reported on 11/14/2023), Disp: , Rfl:   •  budesonide-formoterol (SYMBICORT) 160-4.5 mcg/act inhaler, Inhale 2 puffs 2 (two) times a day Rinse mouth after use. (Patient not taking: Reported on 11/14/2023), Disp: , Rfl:   •  methylPREDNISolone 4 MG tablet therapy pack, Use as directed on package (Patient not taking: Reported on 8/5/2023), Disp: 21 tablet, Rfl: 0  •  norgestrel-ethinyl estradiol (LO/OVRAL) 0.3 mg-30 mcg per tablet, Take 1 tablet by mouth (Patient not taking: Reported on 8/5/2023), Disp: , Rfl:   •  Slynd 4 MG TABS, , Disp: , Rfl:     Current Allergies     Allergies as of 01/02/2024 - Reviewed 01/02/2024   Allergen Reaction Noted   • Strattera [atomoxetine] Palpitations 06/29/2021            The following portions of the patient's history were reviewed and updated as appropriate: allergies, current medications, past family history, past medical history, past social history, past surgical history and problem list.     History reviewed. No pertinent past medical history.    Past Surgical History:   Procedure Laterality Date   • WISDOM TOOTH EXTRACTION         Family History   Problem Relation Age of Onset   •  "Hypertension Father    • Heart attack Father    • Stroke Father    • Kidney cancer Maternal Grandmother    • Stomach cancer Maternal Grandfather    • Heart attack Maternal Grandfather    • Lung cancer Paternal Grandmother    • Polycystic ovary syndrome Cousin         dad          Medications have been verified.        Objective   Pulse (!) 114   Temp 99.4 °F (37.4 °C)   Resp 16   Ht 5' 1\" (1.549 m)   SpO2 100%   BMI 35.90 kg/m²   No LMP recorded.       Physical Exam     Physical Exam  Vitals and nursing note reviewed.   Constitutional:       General: She is not in acute distress.     Appearance: Normal appearance. She is not ill-appearing or diaphoretic.   HENT:      Head: Normocephalic and atraumatic.      Nose: No congestion or rhinorrhea.   Eyes:      General: No scleral icterus.        Right eye: No discharge.         Left eye: No discharge.   Pulmonary:      Effort: Pulmonary effort is normal. No respiratory distress.   Abdominal:      Tenderness: There is no abdominal tenderness. There is no right CVA tenderness, left CVA tenderness or guarding.   Musculoskeletal:         General: Normal range of motion.      Cervical back: Normal range of motion.   Skin:     Coloration: Skin is not jaundiced or pale.      Findings: No bruising, erythema or rash.   Neurological:      General: No focal deficit present.      Mental Status: She is alert and oriented to person, place, and time.   Psychiatric:         Mood and Affect: Mood normal.         Behavior: Behavior normal.         Thought Content: Thought content normal.         Judgment: Judgment normal.                   "

## 2024-01-08 LAB
BACTERIA UR CULT: ABNORMAL
Lab: ABNORMAL
SL AMB ANTIMICROBIAL SUSCEPTIBILITY: ABNORMAL

## 2024-01-09 ENCOUNTER — TELEPHONE (OUTPATIENT)
Dept: URGENT CARE | Facility: CLINIC | Age: 34
End: 2024-01-09

## 2024-09-09 ENCOUNTER — TELEPHONE (OUTPATIENT)
Dept: BARIATRICS | Facility: CLINIC | Age: 34
End: 2024-09-09

## 2024-09-20 ENCOUNTER — APPOINTMENT (EMERGENCY)
Dept: CT IMAGING | Facility: HOSPITAL | Age: 34
End: 2024-09-20
Payer: COMMERCIAL

## 2024-09-20 ENCOUNTER — HOSPITAL ENCOUNTER (EMERGENCY)
Facility: HOSPITAL | Age: 34
Discharge: HOME | End: 2024-09-20
Attending: EMERGENCY MEDICINE
Payer: COMMERCIAL

## 2024-09-20 VITALS
RESPIRATION RATE: 18 BRPM | OXYGEN SATURATION: 98 % | TEMPERATURE: 98.6 F | DIASTOLIC BLOOD PRESSURE: 69 MMHG | SYSTOLIC BLOOD PRESSURE: 111 MMHG | HEART RATE: 87 BPM

## 2024-09-20 DIAGNOSIS — N39.0 URINARY TRACT INFECTION: Primary | ICD-10-CM

## 2024-09-20 LAB
ANION GAP SERPL CALCULATED.3IONS-SCNC: 7 MMOL/L (ref 4–13)
BACTERIA UR QL AUTO: ABNORMAL /HPF
BASOPHILS # BLD AUTO: 0.03 THOUSANDS/ΜL (ref 0–0.1)
BASOPHILS NFR BLD AUTO: 0 % (ref 0–1)
BILIRUB UR QL STRIP: ABNORMAL
BILIRUB UR QL STRIP: NEGATIVE
BUN SERPL-MCNC: 13 MG/DL (ref 5–25)
CALCIUM SERPL-MCNC: 9.1 MG/DL (ref 8.4–10.2)
CHLORIDE SERPL-SCNC: 104 MMOL/L (ref 96–108)
CLARITY UR: ABNORMAL
CLARITY UR: ABNORMAL
CO2 SERPL-SCNC: 26 MMOL/L (ref 21–32)
COLOR UR: ABNORMAL
COLOR UR: ABNORMAL
CREAT SERPL-MCNC: 0.87 MG/DL (ref 0.6–1.3)
EOSINOPHIL # BLD AUTO: 0.09 THOUSAND/ΜL (ref 0–0.61)
EOSINOPHIL NFR BLD AUTO: 1 % (ref 0–6)
ERYTHROCYTE [DISTWIDTH] IN BLOOD BY AUTOMATED COUNT: 13.7 % (ref 11.6–15.1)
EXT PREGNANCY TEST URINE: NEGATIVE
EXT. CONTROL: NORMAL
GFR SERPL CREATININE-BSD FRML MDRD: 86 ML/MIN/1.73SQ M
GLUCOSE SERPL-MCNC: 78 MG/DL (ref 65–140)
GLUCOSE UR STRIP-MCNC: ABNORMAL MG/DL
GLUCOSE UR STRIP-MCNC: NEGATIVE MG/DL
HCT VFR BLD AUTO: 42.4 % (ref 34.8–46.1)
HGB BLD-MCNC: 13.6 G/DL (ref 11.5–15.4)
HGB UR QL STRIP.AUTO: ABNORMAL
HGB UR QL STRIP.AUTO: ABNORMAL
IMM GRANULOCYTES # BLD AUTO: 0.06 THOUSAND/UL (ref 0–0.2)
IMM GRANULOCYTES NFR BLD AUTO: 0 % (ref 0–2)
KETONES UR STRIP-MCNC: ABNORMAL MG/DL
KETONES UR STRIP-MCNC: NEGATIVE MG/DL
LEUKOCYTE ESTERASE UR QL STRIP: ABNORMAL
LEUKOCYTE ESTERASE UR QL STRIP: ABNORMAL
LYMPHOCYTES # BLD AUTO: 2.45 THOUSANDS/ΜL (ref 0.6–4.47)
LYMPHOCYTES NFR BLD AUTO: 17 % (ref 14–44)
MCH RBC QN AUTO: 27.9 PG (ref 26.8–34.3)
MCHC RBC AUTO-ENTMCNC: 32.1 G/DL (ref 31.4–37.4)
MCV RBC AUTO: 87 FL (ref 82–98)
MONOCYTES # BLD AUTO: 0.84 THOUSAND/ΜL (ref 0.17–1.22)
MONOCYTES NFR BLD AUTO: 6 % (ref 4–12)
NEUTROPHILS # BLD AUTO: 10.96 THOUSANDS/ΜL (ref 1.85–7.62)
NEUTS SEG NFR BLD AUTO: 76 % (ref 43–75)
NITRITE UR QL STRIP: NEGATIVE
NITRITE UR QL STRIP: POSITIVE
NON-SQ EPI CELLS URNS QL MICRO: ABNORMAL /HPF
NRBC BLD AUTO-RTO: 0 /100 WBCS
PH UR STRIP.AUTO: 6.5 [PH]
PH UR STRIP.AUTO: 8.5 [PH] (ref 4.5–8)
PLATELET # BLD AUTO: 354 THOUSANDS/UL (ref 149–390)
PMV BLD AUTO: 8.8 FL (ref 8.9–12.7)
POTASSIUM SERPL-SCNC: 4.2 MMOL/L (ref 3.5–5.3)
PROT UR STRIP-MCNC: >=300 MG/DL
PROT UR STRIP-MCNC: ABNORMAL MG/DL
RBC # BLD AUTO: 4.87 MILLION/UL (ref 3.81–5.12)
RBC #/AREA URNS AUTO: ABNORMAL /HPF
SODIUM SERPL-SCNC: 137 MMOL/L (ref 135–147)
SP GR UR STRIP.AUTO: 1.01 (ref 1–1.03)
SP GR UR STRIP.AUTO: >=1.03 (ref 1–1.03)
UROBILINOGEN UR QL STRIP.AUTO: >=8 E.U./DL
UROBILINOGEN UR STRIP-ACNC: <2 MG/DL
WBC # BLD AUTO: 14.43 THOUSAND/UL (ref 4.31–10.16)
WBC #/AREA URNS AUTO: ABNORMAL /HPF

## 2024-09-20 PROCEDURE — 81025 URINE PREGNANCY TEST: CPT | Performed by: PHYSICIAN ASSISTANT

## 2024-09-20 PROCEDURE — 36415 COLL VENOUS BLD VENIPUNCTURE: CPT | Performed by: PHYSICIAN ASSISTANT

## 2024-09-20 PROCEDURE — 81001 URINALYSIS AUTO W/SCOPE: CPT | Performed by: PHYSICIAN ASSISTANT

## 2024-09-20 PROCEDURE — 80048 BASIC METABOLIC PNL TOTAL CA: CPT | Performed by: PHYSICIAN ASSISTANT

## 2024-09-20 PROCEDURE — 99284 EMERGENCY DEPT VISIT MOD MDM: CPT

## 2024-09-20 PROCEDURE — 99284 EMERGENCY DEPT VISIT MOD MDM: CPT | Performed by: PHYSICIAN ASSISTANT

## 2024-09-20 PROCEDURE — 85025 COMPLETE CBC W/AUTO DIFF WBC: CPT | Performed by: PHYSICIAN ASSISTANT

## 2024-09-20 PROCEDURE — 96365 THER/PROPH/DIAG IV INF INIT: CPT

## 2024-09-20 PROCEDURE — 96361 HYDRATE IV INFUSION ADD-ON: CPT

## 2024-09-20 PROCEDURE — 87086 URINE CULTURE/COLONY COUNT: CPT | Performed by: PHYSICIAN ASSISTANT

## 2024-09-20 PROCEDURE — 74176 CT ABD & PELVIS W/O CONTRAST: CPT

## 2024-09-20 PROCEDURE — 96375 TX/PRO/DX INJ NEW DRUG ADDON: CPT

## 2024-09-20 RX ORDER — ONDANSETRON 2 MG/ML
4 INJECTION INTRAMUSCULAR; INTRAVENOUS ONCE
Status: COMPLETED | OUTPATIENT
Start: 2024-09-20 | End: 2024-09-20

## 2024-09-20 RX ORDER — NAPROXEN 500 MG/1
500 TABLET ORAL 2 TIMES DAILY PRN
Qty: 20 TABLET | Refills: 0 | Status: SHIPPED | OUTPATIENT
Start: 2024-09-20

## 2024-09-20 RX ORDER — KETOROLAC TROMETHAMINE 30 MG/ML
15 INJECTION, SOLUTION INTRAMUSCULAR; INTRAVENOUS ONCE
Status: COMPLETED | OUTPATIENT
Start: 2024-09-20 | End: 2024-09-20

## 2024-09-20 RX ORDER — CEFTRIAXONE 1 G/50ML
1000 INJECTION, SOLUTION INTRAVENOUS ONCE
Status: COMPLETED | OUTPATIENT
Start: 2024-09-20 | End: 2024-09-20

## 2024-09-20 RX ORDER — CEPHALEXIN 500 MG/1
500 CAPSULE ORAL EVERY 6 HOURS SCHEDULED
Qty: 28 CAPSULE | Refills: 0 | Status: SHIPPED | OUTPATIENT
Start: 2024-09-20 | End: 2024-09-27

## 2024-09-20 RX ADMIN — CEFTRIAXONE 1000 MG: 1 INJECTION, SOLUTION INTRAVENOUS at 13:28

## 2024-09-20 RX ADMIN — SODIUM CHLORIDE 1000 ML: 0.9 INJECTION, SOLUTION INTRAVENOUS at 11:20

## 2024-09-20 RX ADMIN — KETOROLAC TROMETHAMINE 15 MG: 30 INJECTION, SOLUTION INTRAMUSCULAR; INTRAVENOUS at 11:21

## 2024-09-20 RX ADMIN — ONDANSETRON 4 MG: 2 INJECTION INTRAMUSCULAR; INTRAVENOUS at 11:21

## 2024-09-20 NOTE — ED PROVIDER NOTES
"1. Urinary tract infection      ED Disposition       ED Disposition   Discharge    Condition   Stable    Date/Time   Fri Sep 20, 2024  2:08 PM    Comment   Karli Peck discharge to home/self care.                   Assessment & Plan       Medical Decision Making  Flank/lower back pain as well as lower abd pain in setting of gross hematuria, frequency, dysuria. Chills, no fevers noted. Lab workup reveals leukocytosis. Urine testing with +leukocytes, hematuria c/w UTI. CT abd/pelvis w/o evidence of obstructing stone, mild perivesicular stranding noted. Suspect UTI, possibly ascending infection given back pain/leukocytosis. Given ceftriaxone x1, plan to d/c on keflex pending urine culture results. Return to ED indications reviewed.     Amount and/or Complexity of Data Reviewed  Labs: ordered.  Radiology: ordered.    Risk  Prescription drug management.                     Medications   ketorolac (TORADOL) injection 15 mg (15 mg Intravenous Given 9/20/24 1121)   ondansetron (ZOFRAN) injection 4 mg (4 mg Intravenous Given 9/20/24 1121)   sodium chloride 0.9 % bolus 1,000 mL (0 mL Intravenous Stopped 9/20/24 1220)   cefTRIAXone (ROCEPHIN) IVPB (premix in dextrose) 1,000 mg 50 mL (0 mg Intravenous Stopped 9/20/24 1358)       History of Present Illness     Chief Complaint   Patient presents with    Flank Pain     Pt states \"I think I have a kidney stone. I have left back pain.\" Reports blood and clots with urine. Denies fevers.        Karli is a 35 yo F presenting with gross hematuria as well as lower back/flank and lower abdominal discomfort which began over the past 1-2 days. The symptoms began in lower back/flank but have since spread to lower abdomen primarily. Also cites urinary frequency/dysuria. Chills, no documented fever. Does report history of previous UTI's.       History provided by:  Patient   used: No        Review of Systems   Constitutional:  Negative for chills and fever.   HENT:  " Negative for congestion, rhinorrhea and sore throat.    Eyes:  Negative for pain and visual disturbance.   Respiratory:  Negative for cough, shortness of breath and wheezing.    Cardiovascular:  Negative for chest pain and palpitations.   Gastrointestinal:  Negative for abdominal pain, nausea and vomiting.   Genitourinary:  Positive for dysuria, flank pain, frequency and hematuria. Negative for urgency, vaginal bleeding and vaginal discharge.   Musculoskeletal:  Positive for back pain. Negative for neck pain and neck stiffness.   Skin:  Negative for rash and wound.   Neurological:  Negative for dizziness, weakness, light-headedness and numbness.           Objective     ED Triage Vitals [09/20/24 1037]   Temperature Pulse Blood Pressure Respirations SpO2 Patient Position - Orthostatic VS   98.6 °F (37 °C) 95 121/90 20 99 % Sitting      Temp Source Heart Rate Source BP Location FiO2 (%) Pain Score    Temporal Dorsalis pedis Right arm -- 10 - Worst Possible Pain        Physical Exam  Constitutional:       General: She is not in acute distress.     Appearance: She is well-developed. She is not diaphoretic.   HENT:      Head: Normocephalic and atraumatic.      Right Ear: External ear normal.      Left Ear: External ear normal.   Eyes:      Extraocular Movements:      Right eye: Normal extraocular motion.      Left eye: Normal extraocular motion.      Conjunctiva/sclera: Conjunctivae normal.      Pupils: Pupils are equal, round, and reactive to light.   Cardiovascular:      Rate and Rhythm: Normal rate and regular rhythm.   Pulmonary:      Effort: Pulmonary effort is normal. No accessory muscle usage or respiratory distress.      Breath sounds: No wheezing or rales.   Abdominal:      General: Abdomen is flat. There is no distension.      Tenderness: There is abdominal tenderness. There is no right CVA tenderness, left CVA tenderness or guarding.      Comments: Suprapubic TTP. Mild TTP to L lower back inferior to CVA  although not directly over CVA. No abdominal rigidity/rebound/guarding.    Musculoskeletal:      Cervical back: Normal range of motion. No rigidity.   Skin:     General: Skin is warm and dry.      Capillary Refill: Capillary refill takes less than 2 seconds.      Findings: No erythema or rash.   Neurological:      Mental Status: She is alert and oriented to person, place, and time.      Motor: No abnormal muscle tone.      Coordination: Coordination normal.   Psychiatric:         Behavior: Behavior normal.         Thought Content: Thought content normal.         Judgment: Judgment normal.         Labs Reviewed   CBC AND DIFFERENTIAL - Abnormal       Result Value    WBC 14.43 (*)     RBC 4.87      Hemoglobin 13.6      Hematocrit 42.4      MCV 87      MCH 27.9      MCHC 32.1      RDW 13.7      MPV 8.8 (*)     Platelets 354      nRBC 0      Segmented % 76 (*)     Immature Grans % 0      Lymphocytes % 17      Monocytes % 6      Eosinophils Relative 1      Basophils Relative 0      Absolute Neutrophils 10.96 (*)     Absolute Immature Grans 0.06      Absolute Lymphocytes 2.45      Absolute Monocytes 0.84      Eosinophils Absolute 0.09      Basophils Absolute 0.03     UA W REFLEX TO MICROSCOPIC WITH REFLEX TO CULTURE - Abnormal    Color, UA Dark Red      Clarity, UA Turbid      Specific Gravity, UA >=1.030      pH, UA 6.5      Leukocytes, UA Large (*)     Nitrite, UA Negative      Protein,  (2+) (*)     Glucose, UA Negative      Ketones, UA Negative      Urobilinogen, UA <2.0      Bilirubin, UA Negative      Occult Blood, UA Large (*)    URINE MICROSCOPIC - Abnormal    RBC, UA Innumerable (*)     WBC, UA Innumerable (*)     Epithelial Cells Occasional      Bacteria, UA Occasional     URINE MACROSCOPIC, POC - Abnormal    Color, UA Red      Clarity, UA Cloudy      pH, UA 8.5 (*)     Leukocytes, UA Large (*)     Nitrite, UA Positive (*)     Protein, UA >=300 (*)     Glucose,  (1/4%) (*)     Ketones, UA 40 (2+) (*)      Urobilinogen, UA >=8.0 (*)     Bilirubin, UA Large (*)     Occult Blood, UA Large (*)     Specific [place], UA 1.010     POCT PREGNANCY, URINE - Normal    EXT Preg Test, Ur Negative      Control Valid     URINE CULTURE   BASIC METABOLIC PANEL    Sodium 137      Potassium 4.2      Chloride 104      CO2 26      ANION GAP 7      BUN 13      Creatinine 0.87      Glucose 78      Calcium 9.1      eGFR 86      Narrative:     National Kidney Disease Foundation guidelines for Chronic Kidney Disease (CKD):     Stage 1 with normal or high GFR (GFR > 90 mL/min/1.73 square meters)    Stage 2 Mild CKD (GFR = 60-89 mL/min/1.73 square meters)    Stage 3A Moderate CKD (GFR = 45-59 mL/min/1.73 square meters)    Stage 3B Moderate CKD (GFR = 30-44 mL/min/1.73 square meters)    Stage 4 Severe CKD (GFR = 15-29 mL/min/1.73 square meters)    Stage 5 End Stage CKD (GFR <15 mL/min/1.73 square meters)  Note: GFR calculation is accurate only with a steady state creatinine     CT renal stone study abdomen pelvis wo contrast   Final Interpretation by Serge Patrick MD (09/20 4928)      1.  No hydronephrosis. Punctate nonobstructing right lower pole renal calculus.   2.  The bladder is under distended, limiting evaluation. Mild perivesicular fat stranding, a finding which can be seen with cystitis in the appropriate clinical context. Recommend correlation with urinalysis.               Workstation performed: [id number]             Procedures    ED Medication and Procedure Management   Prior to Admission Medications   Prescriptions Last Dose Informant Patient Reported? Taking?   Slynd 4 MG TABS   Yes No   Patient not taking: Reported on 11/14/2023   albuterol (PROVENTIL HFA,VENTOLIN HFA) 90 mcg/act inhaler   Yes No   Sig: Inhale 2 puffs every 6 (six) hours as needed for wheezing   Patient not taking: Reported on 11/14/2023   budesonide-formoterol (SYMBICORT) 160-4.5 mcg/act inhaler   Yes No   Sig: Inhale 2 puffs 2 (two) times a day Rinse mouth  after use.   Patient not taking: Reported on 11/14/2023   methylPREDNISolone 4 MG tablet therapy pack   No No   Sig: Use as directed on package   Patient not taking: Reported on 8/5/2023   norgestrel-ethinyl estradiol (LO/OVRAL) 0.3 mg-30 mcg per tablet  Self Yes No   Sig: Take 1 tablet by mouth   Patient not taking: Reported on 8/5/2023      Facility-Administered Medications: None     Discharge Medication List as of 9/20/2024  2:12 PM        START taking these medications    Details   cephalexin (KEFLEX) 500 mg capsule Take 1 capsule (500 mg total) by mouth every 6 (six) hours for 7 days, Starting Fri 9/20/2024, Until Fri 9/27/2024, Normal      naproxen (Naprosyn) 500 mg tablet Take 1 tablet (500 mg total) by mouth 2 (two) times a day as needed for mild pain or moderate pain, Starting Fri 9/20/2024, Normal           CONTINUE these medications which have NOT CHANGED    Details   albuterol (PROVENTIL HFA,VENTOLIN HFA) 90 mcg/act inhaler Inhale 2 puffs every 6 (six) hours as needed for wheezing, Historical Med      budesonide-formoterol (SYMBICORT) 160-4.5 mcg/act inhaler Inhale 2 puffs 2 (two) times a day Rinse mouth after use., Historical Med      methylPREDNISolone 4 MG tablet therapy pack Use as directed on package, Normal      norgestrel-ethinyl estradiol (LO/OVRAL) 0.3 mg-30 mcg per tablet Take 1 tablet by mouth, Historical Med      Slynd 4 MG TABS Starting Tue 7/25/2023, Historical Med           No discharge procedures on file.     Santosh Hopkins PA-C  09/20/24 [id number]

## 2024-09-20 NOTE — DISCHARGE INSTRUCTIONS
Please refer to the attached information for strict return instructions. If symptoms worsen or new symptoms develop please return to the ER. Please follow up with your primary care physician for re-evaluation.

## 2024-09-21 LAB — BACTERIA UR CULT: NORMAL

## 2024-11-11 ENCOUNTER — APPOINTMENT (EMERGENCY)
Dept: ULTRASOUND IMAGING | Facility: HOSPITAL | Age: 34
End: 2024-11-11
Payer: COMMERCIAL

## 2024-11-11 ENCOUNTER — HOSPITAL ENCOUNTER (EMERGENCY)
Facility: HOSPITAL | Age: 34
Discharge: HOME | End: 2024-11-11
Attending: EMERGENCY MEDICINE
Payer: COMMERCIAL

## 2024-11-11 VITALS
TEMPERATURE: 98.3 F | OXYGEN SATURATION: 100 % | SYSTOLIC BLOOD PRESSURE: 123 MMHG | HEART RATE: 86 BPM | WEIGHT: 170 LBS | DIASTOLIC BLOOD PRESSURE: 85 MMHG | BODY MASS INDEX: 32.12 KG/M2 | RESPIRATION RATE: 18 BRPM

## 2024-11-11 DIAGNOSIS — O20.0 THREATENED MISCARRIAGE IN EARLY PREGNANCY: Primary | ICD-10-CM

## 2024-11-11 DIAGNOSIS — E87.6 HYPOKALEMIA: ICD-10-CM

## 2024-11-11 LAB
ALBUMIN SERPL BCG-MCNC: 4.1 G/DL (ref 3.5–5)
ALP SERPL-CCNC: 88 U/L (ref 34–104)
ALT SERPL W P-5'-P-CCNC: 11 U/L (ref 7–52)
ANION GAP SERPL CALCULATED.3IONS-SCNC: 7 MMOL/L (ref 4–13)
AST SERPL W P-5'-P-CCNC: 13 U/L (ref 13–39)
B-HCG SERPL-ACNC: 3361.5 MIU/ML (ref 0–5)
BACTERIA UR QL AUTO: ABNORMAL /HPF
BASOPHILS # BLD AUTO: 0.05 THOUSANDS/ÂΜL (ref 0–0.1)
BASOPHILS NFR BLD AUTO: 0 % (ref 0–1)
BILIRUB SERPL-MCNC: 0.3 MG/DL (ref 0.2–1)
BILIRUB UR QL STRIP: NEGATIVE
BUN SERPL-MCNC: 11 MG/DL (ref 5–25)
CALCIUM SERPL-MCNC: 9 MG/DL (ref 8.4–10.2)
CHLORIDE SERPL-SCNC: 101 MMOL/L (ref 96–108)
CLARITY UR: ABNORMAL
CO2 SERPL-SCNC: 27 MMOL/L (ref 21–32)
COLOR UR: ABNORMAL
CREAT SERPL-MCNC: 0.88 MG/DL (ref 0.6–1.3)
EOSINOPHIL # BLD AUTO: 0.18 THOUSAND/ÂΜL (ref 0–0.61)
EOSINOPHIL NFR BLD AUTO: 1 % (ref 0–6)
ERYTHROCYTE [DISTWIDTH] IN BLOOD BY AUTOMATED COUNT: 14.1 % (ref 11.6–15.1)
EXT PREGNANCY TEST URINE: POSITIVE
EXT. CONTROL: ABNORMAL
GFR SERPL CREATININE-BSD FRML MDRD: 85 ML/MIN/1.73SQ M
GLUCOSE SERPL-MCNC: 88 MG/DL (ref 65–140)
GLUCOSE UR STRIP-MCNC: NEGATIVE MG/DL
HCT VFR BLD AUTO: 40.9 % (ref 34.8–46.1)
HGB BLD-MCNC: 12.9 G/DL (ref 11.5–15.4)
HGB UR QL STRIP.AUTO: ABNORMAL
IMM GRANULOCYTES # BLD AUTO: 0.08 THOUSAND/UL (ref 0–0.2)
IMM GRANULOCYTES NFR BLD AUTO: 1 % (ref 0–2)
KETONES UR STRIP-MCNC: NEGATIVE MG/DL
LEUKOCYTE ESTERASE UR QL STRIP: ABNORMAL
LIPASE SERPL-CCNC: 53 U/L (ref 11–82)
LYMPHOCYTES # BLD AUTO: 3.9 THOUSANDS/ÂΜL (ref 0.6–4.47)
LYMPHOCYTES NFR BLD AUTO: 29 % (ref 14–44)
MCH RBC QN AUTO: 28 PG (ref 26.8–34.3)
MCHC RBC AUTO-ENTMCNC: 31.5 G/DL (ref 31.4–37.4)
MCV RBC AUTO: 89 FL (ref 82–98)
MONOCYTES # BLD AUTO: 0.74 THOUSAND/ÂΜL (ref 0.17–1.22)
MONOCYTES NFR BLD AUTO: 5 % (ref 4–12)
MUCOUS THREADS UR QL AUTO: ABNORMAL
NEUTROPHILS # BLD AUTO: 8.63 THOUSANDS/ÂΜL (ref 1.85–7.62)
NEUTS SEG NFR BLD AUTO: 64 % (ref 43–75)
NITRITE UR QL STRIP: NEGATIVE
NON-SQ EPI CELLS URNS QL MICRO: ABNORMAL /HPF
NRBC BLD AUTO-RTO: 0 /100 WBCS
PH UR STRIP.AUTO: 6.5 [PH]
PLATELET # BLD AUTO: 357 THOUSANDS/UL (ref 149–390)
PMV BLD AUTO: 8.7 FL (ref 8.9–12.7)
POTASSIUM SERPL-SCNC: 2.9 MMOL/L (ref 3.5–5.3)
PROT SERPL-MCNC: 7.6 G/DL (ref 6.4–8.4)
PROT UR STRIP-MCNC: ABNORMAL MG/DL
RBC # BLD AUTO: 4.61 MILLION/UL (ref 3.81–5.12)
RBC #/AREA URNS AUTO: ABNORMAL /HPF
SODIUM SERPL-SCNC: 135 MMOL/L (ref 135–147)
SP GR UR STRIP.AUTO: 1.02 (ref 1–1.03)
UROBILINOGEN UR STRIP-ACNC: <2 MG/DL
WBC # BLD AUTO: 13.58 THOUSAND/UL (ref 4.31–10.16)
WBC #/AREA URNS AUTO: ABNORMAL /HPF

## 2024-11-11 PROCEDURE — 81001 URINALYSIS AUTO W/SCOPE: CPT

## 2024-11-11 PROCEDURE — 36415 COLL VENOUS BLD VENIPUNCTURE: CPT

## 2024-11-11 PROCEDURE — 76815 OB US LIMITED FETUS(S): CPT

## 2024-11-11 PROCEDURE — 85025 COMPLETE CBC W/AUTO DIFF WBC: CPT

## 2024-11-11 PROCEDURE — 99284 EMERGENCY DEPT VISIT MOD MDM: CPT | Performed by: EMERGENCY MEDICINE

## 2024-11-11 PROCEDURE — 99284 EMERGENCY DEPT VISIT MOD MDM: CPT

## 2024-11-11 PROCEDURE — 81025 URINE PREGNANCY TEST: CPT

## 2024-11-11 PROCEDURE — 84702 CHORIONIC GONADOTROPIN TEST: CPT | Performed by: EMERGENCY MEDICINE

## 2024-11-11 PROCEDURE — 80053 COMPREHEN METABOLIC PANEL: CPT

## 2024-11-11 PROCEDURE — 83690 ASSAY OF LIPASE: CPT

## 2024-11-11 RX ORDER — POTASSIUM CHLORIDE 1500 MG/1
40 TABLET, EXTENDED RELEASE ORAL ONCE
Status: COMPLETED | OUTPATIENT
Start: 2024-11-11 | End: 2024-11-11

## 2024-11-11 RX ADMIN — POTASSIUM CHLORIDE 40 MEQ: 1500 TABLET, EXTENDED RELEASE ORAL at 17:51

## 2024-11-11 NOTE — ED PROVIDER NOTES
Time reflects when diagnosis was documented in both MDM as applicable and the Disposition within this note       Time User Action Codes Description Comment    11/11/2024  5:44 PM Jasvir Urrutia [O20.0] Threatened miscarriage in early pregnancy     11/11/2024  5:44 PM Jasvir Urrutia [E87.6] Hypokalemia           ED Disposition       ED Disposition   Discharge    Condition   Stable    Date/Time   Mon Nov 11, 2024  5:44 PM    Comment   Karli Peck discharge to home/self care.                   Assessment & Plan       Medical Decision Making  Vaginal bleeding rule out miscarriage or ectopic pregnancy workup in progress including lab work and ultrasound    Amount and/or Complexity of Data Reviewed  Labs: ordered.  Radiology: ordered.    Risk  Prescription drug management.        ED Course as of 11/11/24 1747   Mon Nov 11, 2024   1742 Patient does have prior laboratory studies which show that she is blood type O+ does not require RhoGAM       Medications   potassium chloride (Klor-Con M20) CR tablet 40 mEq (has no administration in time range)       ED Risk Strat Scores                           SBIRT 22yo+      Flowsheet Row Most Recent Value   Initial Alcohol Screen: US AUDIT-C     1. How often do you have a drink containing alcohol? 0 Filed at: 11/11/2024 1400   2. How many drinks containing alcohol do you have on a typical day you are drinking?  0 Filed at: 11/11/2024 1400   3a. Male UNDER 65: How often do you have five or more drinks on one occasion? 0 Filed at: 11/11/2024 1400   3b. FEMALE Any Age, or MALE 65+: How often do you have 4 or more drinks on one occassion? 0 Filed at: 11/11/2024 1400   Audit-C Score 0 Filed at: 11/11/2024 1400   SRAVANI: How many times in the past year have you...    Used an illegal drug or used a prescription medication for non-medical reasons? Never Filed at: 11/11/2024 1400                            History of Present Illness       Chief Complaint   Patient presents with     "Vaginal Bleeding     Pt to ED from home c/o vaginal bleeding. Passed large clot this morning with \"grey matter\" , took pregnancy test this AM that was positive and was told to come to ED       History reviewed. No pertinent past medical history.   Past Surgical History:   Procedure Laterality Date    WISDOM TOOTH EXTRACTION        Family History   Problem Relation Age of Onset    Hypertension Father     Heart attack Father     Stroke Father     Kidney cancer Maternal Grandmother     Stomach cancer Maternal Grandfather     Heart attack Maternal Grandfather     Lung cancer Paternal Grandmother     Polycystic ovary syndrome Cousin         dad       Social History     Tobacco Use    Smoking status: Former     Current packs/day: 0.00     Types: Cigarettes     Quit date: 2021     Years since quitting: 3.4    Smokeless tobacco: Former   Substance Use Topics    Alcohol use: Yes     Comment: once a month (beer)    Drug use: Never      E-Cigarette/Vaping      E-Cigarette/Vaping Substances      I have reviewed and agree with the history as documented.     34-year-old female presents for evaluation of vaginal bleeding and positive home pregnancy test.  Patient has a history of  x 1 and multiple miscarriages secondary to polycystic ovarian disease currently does not have an OB/GYN doctor.  Did pass some grayish tissue earlier today      History provided by:  Patient  Medical Problem  Location:  Vaginal  Quality:  Bleeding  Severity:  Moderate  Onset quality:  Gradual  Duration:  5 days  Timing:  Intermittent  Chronicity:  New  Context:  Vaginal bleeding for the past several days with a history of ectopics and positive home pregnancy test  Associated symptoms: no abdominal pain        Review of Systems   Gastrointestinal:  Negative for abdominal pain.   Genitourinary:  Positive for vaginal bleeding.   All other systems reviewed and are negative.          Objective       ED Triage Vitals [24 1358]   Temperature " Pulse Blood Pressure Respirations SpO2 Patient Position - Orthostatic VS   98.3 °F (36.8 °C) 82 140/92 18 98 % Sitting      Temp Source Heart Rate Source BP Location FiO2 (%) Pain Score    Temporal Monitor Left arm -- No Pain      Vitals      Date and Time Temp Pulse SpO2 Resp BP Pain Score FACES Pain Rating User   11/11/24 1730 -- 86 100 % 18 123/85 -- -- AM   11/11/24 1703 -- 73 100 % 18 112/83 -- -- AM   11/11/24 1358 98.3 °F (36.8 °C) 82 98 % 18 140/92 No Pain -- ML            Physical Exam  Vitals and nursing note reviewed.   Constitutional:       General: She is not in acute distress.     Appearance: She is not toxic-appearing.   HENT:      Head: Normocephalic.      Nose: Nose normal.   Eyes:      Extraocular Movements: Extraocular movements intact.      Pupils: Pupils are equal, round, and reactive to light.   Cardiovascular:      Rate and Rhythm: Normal rate and regular rhythm.      Pulses: Normal pulses.      Heart sounds: No murmur heard.     No friction rub. No gallop.   Pulmonary:      Effort: No respiratory distress.      Breath sounds: No stridor. No wheezing, rhonchi or rales.   Abdominal:      General: There is no distension.      Palpations: Abdomen is soft.      Tenderness: There is no abdominal tenderness.   Musculoskeletal:         General: No swelling, tenderness, deformity or signs of injury.      Cervical back: Normal range of motion and neck supple. No rigidity.      Right lower leg: No edema.      Left lower leg: No edema.   Skin:     General: Skin is warm and dry.      Coloration: Skin is not jaundiced.      Findings: No bruising, erythema or rash.   Neurological:      General: No focal deficit present.      Mental Status: She is alert and oriented to person, place, and time.   Psychiatric:         Mood and Affect: Mood normal.         Results Reviewed       Procedure Component Value Units Date/Time    Urine Microscopic [[id number]]  (Abnormal) Collected: 11/11/24 1608    Lab Status: Final  result Specimen: Urine, Clean Catch Updated: 11/11/24 1717     RBC, UA Innumerable /hpf      WBC, UA 2-4 /hpf      Epithelial Cells Occasional /hpf      Bacteria, UA Occasional /hpf      MUCUS THREADS None Seen    UA w Reflex to Microscopic w Reflex to Culture [[id number]]  (Abnormal) Collected: 11/11/24 1608    Lab Status: Final result Specimen: Urine, Clean Catch Updated: 11/11/24 1716     Color, UA Dark Red     Clarity, UA Turbid     Specific Gravity, UA 1.025     pH, UA 6.5     Leukocytes, UA Moderate     Nitrite, UA Negative     Protein,  (2+) mg/dl      Glucose, UA Negative mg/dl      Ketones, UA Negative mg/dl      Urobilinogen, UA <2.0 mg/dl      Bilirubin, UA Negative     Occult Blood, UA Large    hCG, quantitative [[id number]]  (Abnormal) Collected: 11/11/24 1617    Lab Status: Final result Specimen: Blood from Arm, Left Updated: 11/11/24 1712     HCG, Quant 3,361.5 mIU/mL     Narrative:       Expected Ranges:    HCG results between 5.0 and 25.0 mIU/mL may be indicative of early pregnancy but should be interpreted in light of the total clinical presentation.    HCG can rise to detectable levels in ej and post menopausal women (0-11.6 mIU/mL).     Approximate               Approximate HCG  Gestation age          Concentration ( mIU/mL)  _____________          ______________________   Weeks                      HCG values  0.2-1                       5-50  1-2                           2-3                         100-5000  3-4                         500-43432  4-5                         1000-13280  5-6                         [id number]  6-8                         [id number]  8-12                        [id number]      Comprehensive metabolic panel [[id number]]  (Abnormal) Collected: 11/11/24 1617    Lab Status: Final result Specimen: Blood from Arm, Left Updated: 11/11/24 1642     Sodium 135 mmol/L      Potassium 2.9 mmol/L      Chloride 101 mmol/L      CO2 27 mmol/L      ANION GAP 7  mmol/L      BUN 11 mg/dL      Creatinine 0.88 mg/dL      Glucose 88 mg/dL      Calcium 9.0 mg/dL      AST 13 U/L      ALT 11 U/L      Alkaline Phosphatase 88 U/L      Total Protein 7.6 g/dL      Albumin 4.1 g/dL      Total Bilirubin 0.30 mg/dL      eGFR 85 ml/min/1.73sq m     Narrative:      National Kidney Disease Foundation guidelines for Chronic Kidney Disease (CKD):     Stage 1 with normal or high GFR (GFR > 90 mL/min/1.73 square meters)    Stage 2 Mild CKD (GFR = 60-89 mL/min/1.73 square meters)    Stage 3A Moderate CKD (GFR = 45-59 mL/min/1.73 square meters)    Stage 3B Moderate CKD (GFR = 30-44 mL/min/1.73 square meters)    Stage 4 Severe CKD (GFR = 15-29 mL/min/1.73 square meters)    Stage 5 End Stage CKD (GFR <15 mL/min/1.73 square meters)  Note: GFR calculation is accurate only with a steady state creatinine    Lipase [[id number]]  (Normal) Collected: 11/11/24 1617    Lab Status: Final result Specimen: Blood from Arm, Left Updated: 11/11/24 1642     Lipase 53 u/L     CBC and differential [[id number]]  (Abnormal) Collected: 11/11/24 1617    Lab Status: Final result Specimen: Blood from Arm, Left Updated: 11/11/24 1640     WBC 13.58 Thousand/uL      RBC 4.61 Million/uL      Hemoglobin 12.9 g/dL      Hematocrit 40.9 %      MCV 89 fL      MCH 28.0 pg      MCHC 31.5 g/dL      RDW 14.1 %      MPV 8.7 fL      Platelets 357 Thousands/uL      nRBC 0 /100 WBCs      Segmented % 64 %      Immature Grans % 1 %      Lymphocytes % 29 %      Monocytes % 5 %      Eosinophils Relative 1 %      Basophils Relative 0 %      Absolute Neutrophils 8.63 Thousands/µL      Absolute Immature Grans 0.08 Thousand/uL      Absolute Lymphocytes 3.90 Thousands/µL      Absolute Monocytes 0.74 Thousand/µL      Eosinophils Absolute 0.18 Thousand/µL      Basophils Absolute 0.05 Thousands/µL     POCT pregnancy, urine [[id number]]  (Abnormal) Resulted: 11/11/24 1610    Lab Status: Final result Specimen: Urine Updated: 11/11/24 1610     EXT Preg  Test, Ur Positive     Control Valid            US OB pregnancy limited with transvaginal   Final Interpretation by Марина James MD ([id number])   No intrauterine gestation or adnexal mass identified.   Trace free fluid in the cul-de-sac.   Differential remains early IUP, spontaneous  and ectopic pregnancy.  Correlate with serial quantitative BHCG.            Workstation performed: [id number]             Procedures    ED Medication and Procedure Management   Prior to Admission Medications   Prescriptions Last Dose Informant Patient Reported? Taking?   Slynd 4 MG TABS   Yes No   Patient not taking: Reported on 2023   albuterol (PROVENTIL HFA,VENTOLIN HFA) 90 mcg/act inhaler   Yes No   Sig: Inhale 2 puffs every 6 (six) hours as needed for wheezing   Patient not taking: Reported on 2023   budesonide-formoterol (SYMBICORT) 160-4.5 mcg/act inhaler   Yes No   Sig: Inhale 2 puffs 2 (two) times a day Rinse mouth after use.   Patient not taking: Reported on 2023   methylPREDNISolone 4 MG tablet therapy pack   No No   Sig: Use as directed on package   Patient not taking: Reported on 2023   naproxen (Naprosyn) 500 mg tablet   No No   Sig: Take 1 tablet (500 mg total) by mouth 2 (two) times a day as needed for mild pain or moderate pain   norgestrel-ethinyl estradiol (LO/OVRAL) 0.3 mg-30 mcg per tablet  Self Yes No   Sig: Take 1 tablet by mouth   Patient not taking: Reported on 2023      Facility-Administered Medications: None     Patient's Medications   Discharge Prescriptions    No medications on file     No discharge procedures on file.  ED SEPSIS DOCUMENTATION   Time reflects when diagnosis was documented in both MDM as applicable and the Disposition within this note       Time User Action Codes Description Comment    2024  5:44 PM Jasvir Urrutia [O20.0] Threatened miscarriage in early pregnancy     2024  5:44 PM Jasvir Urrutia [E87.6] Hypokalemia                  Jasvir  Jose G Urrutia DO  11/11/24 1746

## 2025-03-19 ENCOUNTER — APPOINTMENT (EMERGENCY)
Dept: RADIOLOGY | Facility: HOSPITAL | Age: 35
End: 2025-03-19

## 2025-03-19 ENCOUNTER — HOSPITAL ENCOUNTER (EMERGENCY)
Facility: HOSPITAL | Age: 35
Discharge: HOME | End: 2025-03-19
Attending: EMERGENCY MEDICINE | Admitting: EMERGENCY MEDICINE

## 2025-03-19 ENCOUNTER — APPOINTMENT (EMERGENCY)
Dept: CT IMAGING | Facility: HOSPITAL | Age: 35
End: 2025-03-19
Attending: EMERGENCY MEDICINE

## 2025-03-19 VITALS
TEMPERATURE: 98.9 F | RESPIRATION RATE: 22 BRPM | HEART RATE: 65 BPM | OXYGEN SATURATION: 98 % | DIASTOLIC BLOOD PRESSURE: 75 MMHG | SYSTOLIC BLOOD PRESSURE: 113 MMHG

## 2025-03-19 DIAGNOSIS — K21.9 ACID REFLUX: ICD-10-CM

## 2025-03-19 DIAGNOSIS — R10.9 ABDOMINAL PAIN: Primary | ICD-10-CM

## 2025-03-19 LAB
ALBUMIN SERPL BCG-MCNC: 4 G/DL (ref 3.5–5)
ALP SERPL-CCNC: 84 U/L (ref 34–104)
ALT SERPL W P-5'-P-CCNC: 38 U/L (ref 7–52)
ANION GAP SERPL CALCULATED.3IONS-SCNC: 6 MMOL/L (ref 4–13)
AST SERPL W P-5'-P-CCNC: 21 U/L (ref 13–39)
ATRIAL RATE: 62 BPM
BACTERIA UR QL AUTO: ABNORMAL /HPF
BASOPHILS # BLD AUTO: 0.04 THOUSANDS/ÂΜL (ref 0–0.1)
BASOPHILS NFR BLD AUTO: 0 % (ref 0–1)
BILIRUB SERPL-MCNC: 0.28 MG/DL (ref 0.2–1)
BILIRUB UR QL STRIP: NEGATIVE
BUN SERPL-MCNC: 15 MG/DL (ref 5–25)
CALCIUM SERPL-MCNC: 8.6 MG/DL (ref 8.4–10.2)
CARDIAC TROPONIN I PNL SERPL HS: <2 NG/L (ref ?–50)
CHLORIDE SERPL-SCNC: 107 MMOL/L (ref 96–108)
CLARITY UR: CLEAR
CO2 SERPL-SCNC: 26 MMOL/L (ref 21–32)
COLOR UR: YELLOW
CREAT SERPL-MCNC: 0.94 MG/DL (ref 0.6–1.3)
D DIMER PPP FEU-MCNC: 0.43 UG/ML FEU
EOSINOPHIL # BLD AUTO: 0.17 THOUSAND/ÂΜL (ref 0–0.61)
EOSINOPHIL NFR BLD AUTO: 2 % (ref 0–6)
ERYTHROCYTE [DISTWIDTH] IN BLOOD BY AUTOMATED COUNT: 14.1 % (ref 11.6–15.1)
EXT PREGNANCY TEST URINE: NEGATIVE
EXT. CONTROL: NORMAL
GFR SERPL CREATININE-BSD FRML MDRD: 78 ML/MIN/1.73SQ M
GLUCOSE SERPL-MCNC: 86 MG/DL (ref 65–140)
GLUCOSE UR STRIP-MCNC: NEGATIVE MG/DL
HCT VFR BLD AUTO: 35 % (ref 34.8–46.1)
HGB BLD-MCNC: 10.7 G/DL (ref 11.5–15.4)
HGB UR QL STRIP.AUTO: ABNORMAL
IMM GRANULOCYTES # BLD AUTO: 0.05 THOUSAND/UL (ref 0–0.2)
IMM GRANULOCYTES NFR BLD AUTO: 1 % (ref 0–2)
KETONES UR STRIP-MCNC: NEGATIVE MG/DL
LEUKOCYTE ESTERASE UR QL STRIP: NEGATIVE
LIPASE SERPL-CCNC: 46 U/L (ref 11–82)
LYMPHOCYTES # BLD AUTO: 2.68 THOUSANDS/ÂΜL (ref 0.6–4.47)
LYMPHOCYTES NFR BLD AUTO: 26 % (ref 14–44)
MAGNESIUM SERPL-MCNC: 2 MG/DL (ref 1.9–2.7)
MCH RBC QN AUTO: 26.2 PG (ref 26.8–34.3)
MCHC RBC AUTO-ENTMCNC: 30.6 G/DL (ref 31.4–37.4)
MCV RBC AUTO: 86 FL (ref 82–98)
MONOCYTES # BLD AUTO: 0.6 THOUSAND/ÂΜL (ref 0.17–1.22)
MONOCYTES NFR BLD AUTO: 6 % (ref 4–12)
MUCOUS THREADS UR QL AUTO: ABNORMAL
NEUTROPHILS # BLD AUTO: 6.62 THOUSANDS/ÂΜL (ref 1.85–7.62)
NEUTS SEG NFR BLD AUTO: 65 % (ref 43–75)
NITRITE UR QL STRIP: NEGATIVE
NON-SQ EPI CELLS URNS QL MICRO: ABNORMAL /HPF
NRBC BLD AUTO-RTO: 0 /100 WBCS
P AXIS: 57 DEGREES
PH UR STRIP.AUTO: 7 [PH]
PLATELET # BLD AUTO: 306 THOUSANDS/UL (ref 149–390)
PMV BLD AUTO: 9.2 FL (ref 8.9–12.7)
POTASSIUM SERPL-SCNC: 3.6 MMOL/L (ref 3.5–5.3)
PR INTERVAL: 130 MS
PROT SERPL-MCNC: 7.5 G/DL (ref 6.4–8.4)
PROT UR STRIP-MCNC: ABNORMAL MG/DL
QRS AXIS: 5 DEGREES
QRSD INTERVAL: 82 MS
QT INTERVAL: 406 MS
QTC INTERVAL: 412 MS
RBC # BLD AUTO: 4.08 MILLION/UL (ref 3.81–5.12)
RBC #/AREA URNS AUTO: ABNORMAL /HPF
SODIUM SERPL-SCNC: 139 MMOL/L (ref 135–147)
SP GR UR STRIP.AUTO: 1.02 (ref 1–1.03)
T WAVE AXIS: 4 DEGREES
UROBILINOGEN UR STRIP-ACNC: <2 MG/DL
VENTRICULAR RATE: 62 BPM
WBC # BLD AUTO: 10.16 THOUSAND/UL (ref 4.31–10.16)
WBC #/AREA URNS AUTO: ABNORMAL /HPF

## 2025-03-19 PROCEDURE — 83690 ASSAY OF LIPASE: CPT | Performed by: EMERGENCY MEDICINE

## 2025-03-19 PROCEDURE — 99284 EMERGENCY DEPT VISIT MOD MDM: CPT

## 2025-03-19 PROCEDURE — 85025 COMPLETE CBC W/AUTO DIFF WBC: CPT | Performed by: EMERGENCY MEDICINE

## 2025-03-19 PROCEDURE — 81001 URINALYSIS AUTO W/SCOPE: CPT | Performed by: EMERGENCY MEDICINE

## 2025-03-19 PROCEDURE — 71045 X-RAY EXAM CHEST 1 VIEW: CPT

## 2025-03-19 PROCEDURE — 84484 ASSAY OF TROPONIN QUANT: CPT | Performed by: EMERGENCY MEDICINE

## 2025-03-19 PROCEDURE — 81025 URINE PREGNANCY TEST: CPT | Performed by: EMERGENCY MEDICINE

## 2025-03-19 PROCEDURE — 74177 CT ABD & PELVIS W/CONTRAST: CPT

## 2025-03-19 PROCEDURE — 80053 COMPREHEN METABOLIC PANEL: CPT | Performed by: EMERGENCY MEDICINE

## 2025-03-19 PROCEDURE — 36415 COLL VENOUS BLD VENIPUNCTURE: CPT | Performed by: EMERGENCY MEDICINE

## 2025-03-19 PROCEDURE — 71260 CT THORAX DX C+: CPT

## 2025-03-19 PROCEDURE — 99285 EMERGENCY DEPT VISIT HI MDM: CPT | Performed by: EMERGENCY MEDICINE

## 2025-03-19 PROCEDURE — 93005 ELECTROCARDIOGRAM TRACING: CPT

## 2025-03-19 PROCEDURE — 96361 HYDRATE IV INFUSION ADD-ON: CPT

## 2025-03-19 PROCEDURE — 85379 FIBRIN DEGRADATION QUANT: CPT | Performed by: EMERGENCY MEDICINE

## 2025-03-19 PROCEDURE — 93010 ELECTROCARDIOGRAM REPORT: CPT | Performed by: INTERNAL MEDICINE

## 2025-03-19 PROCEDURE — 96375 TX/PRO/DX INJ NEW DRUG ADDON: CPT

## 2025-03-19 PROCEDURE — 96374 THER/PROPH/DIAG INJ IV PUSH: CPT

## 2025-03-19 PROCEDURE — 83735 ASSAY OF MAGNESIUM: CPT | Performed by: EMERGENCY MEDICINE

## 2025-03-19 RX ORDER — KETOROLAC TROMETHAMINE 30 MG/ML
15 INJECTION, SOLUTION INTRAMUSCULAR; INTRAVENOUS ONCE
Status: COMPLETED | OUTPATIENT
Start: 2025-03-19 | End: 2025-03-19

## 2025-03-19 RX ORDER — FAMOTIDINE 10 MG/ML
20 INJECTION, SOLUTION INTRAVENOUS ONCE
Status: COMPLETED | OUTPATIENT
Start: 2025-03-19 | End: 2025-03-19

## 2025-03-19 RX ORDER — DICYCLOMINE HCL 20 MG
20 TABLET ORAL ONCE
Status: COMPLETED | OUTPATIENT
Start: 2025-03-19 | End: 2025-03-19

## 2025-03-19 RX ORDER — DICYCLOMINE HCL 20 MG
20 TABLET ORAL EVERY 6 HOURS PRN
Qty: 30 TABLET | Refills: 0 | Status: SHIPPED | OUTPATIENT
Start: 2025-03-19

## 2025-03-19 RX ORDER — FAMOTIDINE 20 MG/1
20 TABLET, FILM COATED ORAL 2 TIMES DAILY
Qty: 60 TABLET | Refills: 0 | Status: SHIPPED | OUTPATIENT
Start: 2025-03-19

## 2025-03-19 RX ADMIN — KETOROLAC TROMETHAMINE 15 MG: 30 INJECTION, SOLUTION INTRAMUSCULAR; INTRAVENOUS at 10:04

## 2025-03-19 RX ADMIN — FAMOTIDINE 20 MG: 10 INJECTION, SOLUTION INTRAVENOUS at 10:04

## 2025-03-19 RX ADMIN — IOHEXOL 75 ML: 350 INJECTION, SOLUTION INTRAVENOUS at 10:56

## 2025-03-19 RX ADMIN — DICYCLOMINE HYDROCHLORIDE 20 MG: 20 TABLET ORAL at 11:57

## 2025-03-19 RX ADMIN — SODIUM CHLORIDE 1000 ML: 0.9 INJECTION, SOLUTION INTRAVENOUS at 10:03

## 2025-03-19 NOTE — ED PROVIDER NOTES
Time reflects when diagnosis was documented in both MDM as applicable and the Disposition within this note       Time User Action Codes Description Comment    3/19/2025 11:51 AM Louise Clemens Add [R10.9] Abdominal pain     3/19/2025 11:52 AM Louise Clemens Add [K21.9] Acid reflux           ED Disposition       ED Disposition   Discharge    Condition   Stable    Date/Time   Wed Mar 19, 2025 11:51 AM    Comment   Karli Peck discharge to home/self care.                   Assessment & Plan       Medical Decision Making  Patient blood work, EKG, chest x-ray  Give IV fluids, pain medication continue to monitor patient for any worsening symptoms    Radiology results were unremarkable.  Patient feels better after IV fluid hydration and medication given in the ED.  At this time the etiology of patient's abdominal pain is unclear however it may be related to acid reflux.  Patient states that she did have some reflux when she was pregnant.  At this time patient placed on Pepcid as well as Bentyl.  Abdominal pain and discharged with follow-up to PCP and GI.  Close return instructions given to return to the ER for any worsening symptoms.  Patient agrees with discharge plan.  Patient well appearing at time of discharge.    Please Note: Fluency Direct voice recognition software may have been used in the creation of this document. Wrong words or sound a like substitutions may have occurred due to the inherent limitations of the voice software.         Amount and/or Complexity of Data Reviewed  External Data Reviewed: labs.  Labs: ordered. Decision-making details documented in ED Course.  Radiology: ordered. Decision-making details documented in ED Course.  ECG/medicine tests: ordered and independent interpretation performed. Decision-making details documented in ED Course.    Risk  Prescription drug management.        ED Course as of 03/19/25 1233   Wed Mar 19, 2025   1151 Patient reexamined at bedside.  Patient is feeling  better with her abdominal pain.       Medications   sodium chloride 0.9 % bolus 1,000 mL (0 mL Intravenous Stopped 3/19/25 1140)   ketorolac (TORADOL) injection 15 mg (15 mg Intravenous Given 3/19/25 1004)   Famotidine (PF) (PEPCID) injection 20 mg (20 mg Intravenous Given 3/19/25 1004)   iohexol (OMNIPAQUE) 350 MG/ML injection (MULTI-DOSE) 75 mL (75 mL Intravenous Given 3/19/25 1056)   dicyclomine (BENTYL) tablet 20 mg (20 mg Oral Given 3/19/25 1157)       ED Risk Strat Scores                            SBIRT 22yo+      Flowsheet Row Most Recent Value   Initial Alcohol Screen: US AUDIT-C     1. How often do you have a drink containing alcohol? 0 Filed at: 03/19/2025 0936   2. How many drinks containing alcohol do you have on a typical day you are drinking?  0 Filed at: 03/19/2025 0936   3a. Male UNDER 65: How often do you have five or more drinks on one occasion? 0 Filed at: 03/19/2025 0936   3b. FEMALE Any Age, or MALE 65+: How often do you have 4 or more drinks on one occassion? 0 Filed at: 03/19/2025 0936   Audit-C Score 0 Filed at: 03/19/2025 0936   SRAVANI: How many times in the past year have you...    Used an illegal drug or used a prescription medication for non-medical reasons? Never Filed at: 03/19/2025 0936                            History of Present Illness       Chief Complaint   Patient presents with    Abdominal Pain     LUQ pain, left rib pain.  No N/V/D.  +SOB       History reviewed. No pertinent past medical history.   Past Surgical History:   Procedure Laterality Date    WISDOM TOOTH EXTRACTION        Family History   Problem Relation Age of Onset    Hypertension Father     Heart attack Father     Stroke Father     Kidney cancer Maternal Grandmother     Stomach cancer Maternal Grandfather     Heart attack Maternal Grandfather     Lung cancer Paternal Grandmother     Polycystic ovary syndrome Cousin         dad       Social History     Tobacco Use    Smoking status: Former     Current packs/day:  0.00     Types: Cigarettes     Quit date: 5/29/2021     Years since quitting: 3.8    Smokeless tobacco: Former   Vaping Use    Vaping status: Never Used   Substance Use Topics    Alcohol use: Yes     Comment: once a month (beer)    Drug use: Never      E-Cigarette/Vaping    E-Cigarette Use Never User       E-Cigarette/Vaping Substances      I have reviewed and agree with the history as documented.     34-year-old female with past medical history of obesity, asthma, presents to the ED for evaluation of left upper quadrant abdominal pain since yesterday.  Patient states that she feels a fullness in her abdomen radiating into her left lower ribs.  Patient has had this discomfort intermittently over the past month however pain became more prominent and worsened yesterday.  Patient did not take anything for pain.  Patient denies any nausea, vomiting, diarrhea, dysuria, or any increased urinary frequency.  Patient denies any fevers or chills.  Patient denies any cold or cough symptoms.  Patient states that she was on Zepbound for weight loss until last month.  Patient denies take Zepbound this month as her insurance is currently denying it.  Patient came to the ED for ongoing symptoms.      History provided by:  Patient  Abdominal Pain  Associated symptoms: no chest pain, no chills, no cough, no dysuria, no fever, no hematuria, no shortness of breath, no sore throat and no vomiting        Review of Systems   Constitutional:  Negative for chills and fever.   HENT:  Negative for ear pain and sore throat.    Eyes:  Negative for pain and visual disturbance.   Respiratory:  Negative for cough and shortness of breath.    Cardiovascular:  Negative for chest pain and palpitations.   Gastrointestinal:  Positive for abdominal pain. Negative for vomiting.   Genitourinary:  Negative for dysuria and hematuria.   Musculoskeletal:  Negative for arthralgias and back pain.   Skin:  Negative for color change and rash.   Neurological:   Negative for seizures and syncope.   All other systems reviewed and are negative.          Objective       ED Triage Vitals   Temperature Pulse Blood Pressure Respirations SpO2 Patient Position - Orthostatic VS   03/19/25 0937 03/19/25 0940 03/19/25 0940 03/19/25 0940 03/19/25 0940 03/19/25 0940   98.9 °F (37.2 °C) 66 111/67 16 100 % Sitting      Temp Source Heart Rate Source BP Location FiO2 (%) Pain Score    03/19/25 0937 03/19/25 0940 03/19/25 0940 -- 03/19/25 1004    Temporal Monitor Right arm  5      Vitals      Date and Time Temp Pulse SpO2 Resp BP Pain Score FACES Pain Rating User   03/19/25 1150 -- 65 98 % 22 113/75 -- -- KO   03/19/25 1034 -- 56 100 % 16 105/63 -- -- MB   03/19/25 1004 -- -- -- -- -- 5 -- KO   03/19/25 0945 -- 64 98 % -- 111/67 -- -- MB   03/19/25 0940 -- 66 100 % 16 111/67 -- -- MB   03/19/25 0937 98.9 °F (37.2 °C) -- -- -- -- -- -- KP            Physical Exam  Vitals and nursing note reviewed.   Constitutional:       General: She is not in acute distress.     Appearance: She is well-developed.   HENT:      Head: Normocephalic and atraumatic.   Eyes:      Conjunctiva/sclera: Conjunctivae normal.   Cardiovascular:      Rate and Rhythm: Normal rate and regular rhythm.      Heart sounds: No murmur heard.  Pulmonary:      Effort: Pulmonary effort is normal. No respiratory distress.      Breath sounds: Normal breath sounds.   Abdominal:      Palpations: Abdomen is soft.      Tenderness: There is abdominal tenderness in the left upper quadrant.      Comments: Abdomen is soft, mildly distended, with bowel sound present to all 4 quadrants.  Tenderness to palpation noted to the left upper quadrant and left lower rib border region.   Musculoskeletal:         General: No swelling.      Cervical back: Neck supple.   Skin:     General: Skin is warm and dry.      Capillary Refill: Capillary refill takes less than 2 seconds.   Neurological:      Mental Status: She is alert.   Psychiatric:         Mood  and Affect: Mood normal.         Results Reviewed       Procedure Component Value Units Date/Time    Comprehensive metabolic panel [[id number]] Collected: 03/19/25 0957    Lab Status: Final result Specimen: Blood from Arm, Left Updated: 03/19/25 1032     Sodium 139 mmol/L      Potassium 3.6 mmol/L      Chloride 107 mmol/L      CO2 26 mmol/L      ANION GAP 6 mmol/L      BUN 15 mg/dL      Creatinine 0.94 mg/dL      Glucose 86 mg/dL      Calcium 8.6 mg/dL      AST 21 U/L      ALT 38 U/L      Alkaline Phosphatase 84 U/L      Total Protein 7.5 g/dL      Albumin 4.0 g/dL      Total Bilirubin 0.28 mg/dL      eGFR 78 ml/min/1.73sq m     Narrative:      [employer] guidelines for Chronic Kidney Disease (CKD):     Stage 1 with normal or high GFR (GFR > 90 mL/min/1.73 square meters)    Stage 2 Mild CKD (GFR = 60-89 mL/min/1.73 square meters)    Stage 3A Moderate CKD (GFR = 45-59 mL/min/1.73 square meters)    Stage 3B Moderate CKD (GFR = 30-44 mL/min/1.73 square meters)    Stage 4 Severe CKD (GFR = 15-29 mL/min/1.73 square meters)    Stage 5 End Stage CKD (GFR <15 mL/min/1.73 square meters)  Note: GFR calculation is accurate only with a steady state creatinine    Lipase [[id number]]  (Normal) Collected: 03/19/25 0957    Lab Status: Final result Specimen: Blood from Arm, Left Updated: 03/19/25 1032     Lipase 46 u/L     Magnesium [[id number]]  (Normal) Collected: 03/19/25 0957    Lab Status: Final result Specimen: Blood from Arm, Left Updated: 03/19/25 1032     Magnesium 2.0 mg/dL     HS Troponin 0hr (reflex protocol) [[id number]]  (Normal) Collected: 03/19/25 0957    Lab Status: Final result Specimen: Blood from Arm, Left Updated: 03/19/25 1032     hs TnI 0hr <2 ng/L     D-Dimer [[id number]]  (Normal) Collected: 03/19/25 0957    Lab Status: Final result Specimen: Blood from Arm, Left Updated: 03/19/25 1028     D-Dimer, Quant 0.43 ug/ml FEU     Urine Microscopic [[id number]]  (Abnormal) Collected: 03/19/25 0957     Lab Status: Final result Specimen: Urine, Clean Catch Updated: 03/19/25 1019     RBC, UA 1-2 /hpf      WBC, UA None Seen /hpf      Epithelial Cells Occasional /hpf      Bacteria, UA Occasional /hpf      MUCUS THREADS Occasional    UA w Reflex to Microscopic w Reflex to Culture [[id number]]  (Abnormal) Collected: 03/19/25 0957    Lab Status: Final result Specimen: Urine, Clean Catch Updated: 03/19/25 1010     Color, UA Yellow     Clarity, UA Clear     Specific Gravity, UA 1.020     pH, UA 7.0     Leukocytes, UA Negative     Nitrite, UA Negative     Protein, UA Trace mg/dl      Glucose, UA Negative mg/dl      Ketones, UA Negative mg/dl      Urobilinogen, UA <2.0 mg/dl      Bilirubin, UA Negative     Occult Blood, UA Small    CBC and differential [[id number]]  (Abnormal) Collected: 03/19/25 0957    Lab Status: Final result Specimen: Blood from Arm, Left Updated: 03/19/25 1009     WBC 10.16 Thousand/uL      RBC 4.08 Million/uL      Hemoglobin 10.7 g/dL      Hematocrit 35.0 %      MCV 86 fL      MCH 26.2 pg      MCHC 30.6 g/dL      RDW 14.1 %      MPV 9.2 fL      Platelets 306 Thousands/uL      nRBC 0 /100 WBCs      Segmented % 65 %      Immature Grans % 1 %      Lymphocytes % 26 %      Monocytes % 6 %      Eosinophils Relative 2 %      Basophils Relative 0 %      Absolute Neutrophils 6.62 Thousands/µL      Absolute Immature Grans 0.05 Thousand/uL      Absolute Lymphocytes 2.68 Thousands/µL      Absolute Monocytes 0.60 Thousand/µL      Eosinophils Absolute 0.17 Thousand/µL      Basophils Absolute 0.04 Thousands/µL     POCT pregnancy, urine [[id number]]  (Normal) Collected: 03/19/25 1007    Lab Status: Final result Updated: 03/19/25 1007     EXT Preg Test, Ur Negative     Control Valid            CT chest abdomen pelvis w contrast   Final Interpretation by Scar Chan MD (03/19 1144)   No acute findings in the chest, abdomen or pelvis. No specific etiology for left upper quadrant pain identified.                Workstation performed: [id number]         XR chest 1 view portable    (Results Pending)       ECG 12 Lead Documentation Only    Date/Time: 3/19/2025 9:45 AM    Performed by: Louise Clemens DO  Authorized by: Louise Clemens DO    Indications / Diagnosis:  Abdominal pain  ECG reviewed by me, the ED Provider: yes    Patient location:  ED  Previous ECG:     Previous ECG:  Unavailable    Comparison to cardiac monitor: Yes    Interpretation:     Interpretation: normal    Comments:      Sinus rhythm, rate 62, normal axis, normal intervals, no acute ST/T wave abnormalities noted, otherwise unremarkable EKG, no previous EKG available for comparison.      ED Medication and Procedure Management   Prior to Admission Medications   Prescriptions Last Dose Informant Patient Reported? Taking?   Slynd 4 MG TABS   Yes No   Patient not taking: Reported on 11/14/2023   albuterol (PROVENTIL HFA,VENTOLIN HFA) 90 mcg/act inhaler   Yes No   Sig: Inhale 2 puffs every 6 (six) hours as needed for wheezing   Patient not taking: Reported on 11/14/2023   budesonide-formoterol (SYMBICORT) 160-4.5 mcg/act inhaler   Yes No   Sig: Inhale 2 puffs 2 (two) times a day Rinse mouth after use.   Patient not taking: Reported on 11/14/2023   methylPREDNISolone 4 MG tablet therapy pack   No No   Sig: Use as directed on package   Patient not taking: Reported on 8/5/2023   naproxen (Naprosyn) 500 mg tablet   No No   Sig: Take 1 tablet (500 mg total) by mouth 2 (two) times a day as needed for mild pain or moderate pain   norgestrel-ethinyl estradiol (LO/OVRAL) 0.3 mg-30 mcg per tablet  Self Yes No   Sig: Take 1 tablet by mouth   Patient not taking: Reported on 8/5/2023      Facility-Administered Medications: None     Discharge Medication List as of 3/19/2025 11:52 AM        START taking these medications    Details   dicyclomine (BENTYL) 20 mg tablet Take 1 tablet (20 mg total) by mouth every 6 (six) hours as needed (abd pain), Starting Wed 3/19/2025,  Normal      famotidine (PEPCID) 20 mg tablet Take 1 tablet (20 mg total) by mouth 2 (two) times a day, Starting Wed 3/19/2025, Normal           CONTINUE these medications which have NOT CHANGED    Details   albuterol (PROVENTIL HFA,VENTOLIN HFA) 90 mcg/act inhaler Inhale 2 puffs every 6 (six) hours as needed for wheezing, Historical Med      budesonide-formoterol (SYMBICORT) 160-4.5 mcg/act inhaler Inhale 2 puffs 2 (two) times a day Rinse mouth after use., Historical Med      methylPREDNISolone 4 MG tablet therapy pack Use as directed on package, Normal      naproxen (Naprosyn) 500 mg tablet Take 1 tablet (500 mg total) by mouth 2 (two) times a day as needed for mild pain or moderate pain, Starting Fri 9/20/2024, Normal      norgestrel-ethinyl estradiol (LO/OVRAL) 0.3 mg-30 mcg per tablet Take 1 tablet by mouth, Historical Med      Slynd 4 MG TABS Starting Tue 7/25/2023, Historical Med           No discharge procedures on file.  ED SEPSIS DOCUMENTATION   Time reflects when diagnosis was documented in both MDM as applicable and the Disposition within this note       Time User Action Codes Description Comment    3/19/2025 11:51 AM Louise Clemens [R10.9] Abdominal pain     3/19/2025 11:52 AM Louise Clemens Add [K21.9] Acid reflux                  Louise Clemens DO  03/19/25 [id number]